# Patient Record
Sex: FEMALE | Race: WHITE | NOT HISPANIC OR LATINO | Employment: OTHER | URBAN - METROPOLITAN AREA
[De-identification: names, ages, dates, MRNs, and addresses within clinical notes are randomized per-mention and may not be internally consistent; named-entity substitution may affect disease eponyms.]

---

## 2020-06-16 ENCOUNTER — TELEPHONE (OUTPATIENT)
Dept: HEALTH INFORMATION MANAGEMENT | Facility: OTHER | Age: 55
End: 2020-06-16

## 2020-06-16 SDOH — ECONOMIC STABILITY: TRANSPORTATION INSECURITY
IN THE PAST 12 MONTHS, HAS LACK OF RELIABLE TRANSPORTATION KEPT YOU FROM MEDICAL APPOINTMENTS, MEETINGS, WORK OR FROM GETTING THINGS NEEDED FOR DAILY LIVING?: NO

## 2020-06-16 SDOH — ECONOMIC STABILITY: INCOME INSECURITY: IN THE LAST 12 MONTHS, WAS THERE A TIME WHEN YOU WERE NOT ABLE TO PAY THE MORTGAGE OR RENT ON TIME?: NO

## 2020-06-16 SDOH — HEALTH STABILITY: PHYSICAL HEALTH: ON AVERAGE, HOW MANY MINUTES DO YOU ENGAGE IN EXERCISE AT THIS LEVEL?: 10 MINUTES

## 2020-06-16 SDOH — ECONOMIC STABILITY: HOUSING INSECURITY
IN THE LAST 12 MONTHS, WAS THERE A TIME WHEN YOU DID NOT HAVE A STEADY PLACE TO SLEEP OR SLEPT IN A SHELTER (INCLUDING NOW)?: NO

## 2020-06-16 SDOH — ECONOMIC STABILITY: HOUSING INSECURITY: IN THE LAST 12 MONTHS, HOW MANY PLACES HAVE YOU LIVED?: 1

## 2020-06-16 SDOH — HEALTH STABILITY: PHYSICAL HEALTH: ON AVERAGE, HOW MANY DAYS PER WEEK DO YOU ENGAGE IN MODERATE TO STRENUOUS EXERCISE (LIKE A BRISK WALK)?: 2 DAYS

## 2020-06-16 SDOH — HEALTH STABILITY: MENTAL HEALTH
STRESS IS WHEN SOMEONE FEELS TENSE, NERVOUS, ANXIOUS, OR CAN'T SLEEP AT NIGHT BECAUSE THEIR MIND IS TROUBLED. HOW STRESSED ARE YOU?: RATHER MUCH

## 2020-06-16 SDOH — ECONOMIC STABILITY: TRANSPORTATION INSECURITY
IN THE PAST 12 MONTHS, HAS THE LACK OF TRANSPORTATION KEPT YOU FROM MEDICAL APPOINTMENTS OR FROM GETTING MEDICATIONS?: NO

## 2020-06-16 ASSESSMENT — SOCIAL DETERMINANTS OF HEALTH (SDOH)
IN A TYPICAL WEEK, HOW MANY TIMES DO YOU TALK ON THE PHONE WITH FAMILY, FRIENDS, OR NEIGHBORS?: THREE TIMES A WEEK
HOW OFTEN DO YOU ATTEND CHURCH OR RELIGIOUS SERVICES?: MORE THAN 4 TIMES PER YEAR
HOW MANY DRINKS CONTAINING ALCOHOL DO YOU HAVE ON A TYPICAL DAY WHEN YOU ARE DRINKING: 1 OR 2
WITHIN THE PAST 12 MONTHS, YOU WORRIED THAT YOUR FOOD WOULD RUN OUT BEFORE YOU GOT THE MONEY TO BUY MORE: NEVER TRUE
HOW OFTEN DO YOU HAVE SIX OR MORE DRINKS ON ONE OCCASION: NEVER
HOW OFTEN DO YOU ATTENT MEETINGS OF THE CLUB OR ORGANIZATION YOU BELONG TO?: NEVER
DO YOU BELONG TO ANY CLUBS OR ORGANIZATIONS SUCH AS CHURCH GROUPS UNIONS, FRATERNAL OR ATHLETIC GROUPS, OR SCHOOL GROUPS?: NO
WITHIN THE PAST 12 MONTHS, THE FOOD YOU BOUGHT JUST DIDN'T LAST AND YOU DIDN'T HAVE MONEY TO GET MORE: NEVER TRUE
HOW OFTEN DO YOU HAVE A DRINK CONTAINING ALCOHOL: 2-4 TIMES A MONTH
HOW OFTEN DO YOU GET TOGETHER WITH FRIENDS OR RELATIVES?: ONCE A WEEK
HOW HARD IS IT FOR YOU TO PAY FOR THE VERY BASICS LIKE FOOD, HOUSING, MEDICAL CARE, AND HEATING?: NOT VERY HARD

## 2020-06-16 NOTE — TELEPHONE ENCOUNTER
1. Caller Name: Rowena Tejeda                Call Back Number: [phone]  [facility] PCP or Specialty Provider: No          2.  In the last two weeks, has the patient had any new or worsening symptoms (not explained by alternative diagnosis)? No. Same body aches and pains mostly in legs for over a year r/t Arthritis;takes Ibuprofen.    3.  Does patient have any comoribidities? None     4.  Has the patient traveled in the last 14 days OR had any known contact with someone who is suspected or confirmed to have COVID-19?  No.    5. Disposition: Cleared by RN Triage as potential is low for COVID-19; OK to keep/schedule appointment    Note routed to [facility] Provider: FERNANDA only.

## 2020-06-19 ENCOUNTER — HOSPITAL ENCOUNTER (OUTPATIENT)
Dept: LAB | Facility: MEDICAL CENTER | Age: 55
End: 2020-06-19
Attending: FAMILY MEDICINE
Payer: COMMERCIAL

## 2020-06-19 ENCOUNTER — OFFICE VISIT (OUTPATIENT)
Dept: MEDICAL GROUP | Age: 55
End: 2020-06-19
Payer: COMMERCIAL

## 2020-06-19 VITALS
DIASTOLIC BLOOD PRESSURE: 62 MMHG | HEART RATE: 71 BPM | TEMPERATURE: 97.7 F | BODY MASS INDEX: 31.04 KG/M2 | SYSTOLIC BLOOD PRESSURE: 120 MMHG | OXYGEN SATURATION: 99 % | WEIGHT: 181.8 LBS | HEIGHT: 64 IN

## 2020-06-19 DIAGNOSIS — Z00.00 ANNUAL PHYSICAL EXAM: ICD-10-CM

## 2020-06-19 DIAGNOSIS — M79.10 MYALGIA, UNSPECIFIED SITE: ICD-10-CM

## 2020-06-19 DIAGNOSIS — Z12.12 SCREENING FOR COLORECTAL CANCER: Primary | ICD-10-CM

## 2020-06-19 DIAGNOSIS — Z00.00 ENCOUNTER FOR MEDICAL EXAMINATION TO ESTABLISH CARE: ICD-10-CM

## 2020-06-19 DIAGNOSIS — Z23 NEED FOR VACCINATION: ICD-10-CM

## 2020-06-19 DIAGNOSIS — E66.9 OBESITY (BMI 30-39.9): ICD-10-CM

## 2020-06-19 DIAGNOSIS — Z12.11 SCREENING FOR COLORECTAL CANCER: Primary | ICD-10-CM

## 2020-06-19 DIAGNOSIS — R10.11 RUQ PAIN: ICD-10-CM

## 2020-06-19 LAB
25(OH)D3 SERPL-MCNC: 30 NG/ML (ref 30–100)
ALBUMIN SERPL BCP-MCNC: 4.2 G/DL (ref 3.2–4.9)
ALBUMIN/GLOB SERPL: 1.4 G/DL
ALP SERPL-CCNC: 75 U/L (ref 30–99)
ALT SERPL-CCNC: 24 U/L (ref 2–50)
ANION GAP SERPL CALC-SCNC: 14 MMOL/L (ref 7–16)
AST SERPL-CCNC: 26 U/L (ref 12–45)
BILIRUB SERPL-MCNC: 0.6 MG/DL (ref 0.1–1.5)
BUN SERPL-MCNC: 13 MG/DL (ref 8–22)
CALCIUM SERPL-MCNC: 9.5 MG/DL (ref 8.5–10.5)
CHLORIDE SERPL-SCNC: 104 MMOL/L (ref 96–112)
CHOLEST SERPL-MCNC: 204 MG/DL (ref 100–199)
CO2 SERPL-SCNC: 25 MMOL/L (ref 20–33)
CREAT SERPL-MCNC: 0.51 MG/DL (ref 0.5–1.4)
ERYTHROCYTE [DISTWIDTH] IN BLOOD BY AUTOMATED COUNT: 39.6 FL (ref 35.9–50)
FASTING STATUS PATIENT QL REPORTED: NORMAL
GLOBULIN SER CALC-MCNC: 3 G/DL (ref 1.9–3.5)
GLUCOSE SERPL-MCNC: 76 MG/DL (ref 65–99)
HCT VFR BLD AUTO: 42.7 % (ref 37–47)
HDLC SERPL-MCNC: 57 MG/DL
HGB BLD-MCNC: 14.7 G/DL (ref 12–16)
LDLC SERPL CALC-MCNC: 125 MG/DL
MCH RBC QN AUTO: 30.1 PG (ref 27–33)
MCHC RBC AUTO-ENTMCNC: 34.4 G/DL (ref 33.6–35)
MCV RBC AUTO: 87.5 FL (ref 81.4–97.8)
PLATELET # BLD AUTO: 170 K/UL (ref 164–446)
PMV BLD AUTO: 8.8 FL (ref 9–12.9)
POTASSIUM SERPL-SCNC: 4.2 MMOL/L (ref 3.6–5.5)
PROT SERPL-MCNC: 7.2 G/DL (ref 6–8.2)
RBC # BLD AUTO: 4.88 M/UL (ref 4.2–5.4)
RHEUMATOID FACT SER IA-ACNC: 14 IU/ML (ref 0–14)
SODIUM SERPL-SCNC: 143 MMOL/L (ref 135–145)
T3FREE SERPL-MCNC: 2.55 PG/ML (ref 2–4.4)
T4 FREE SERPL-MCNC: 0.97 NG/DL (ref 0.93–1.7)
TRIGL SERPL-MCNC: 108 MG/DL (ref 0–149)
TSH SERPL DL<=0.005 MIU/L-ACNC: 2.29 UIU/ML (ref 0.38–5.33)
WBC # BLD AUTO: 4.2 K/UL (ref 4.8–10.8)

## 2020-06-19 PROCEDURE — 84443 ASSAY THYROID STIM HORMONE: CPT

## 2020-06-19 PROCEDURE — 90471 IMMUNIZATION ADMIN: CPT | Performed by: FAMILY MEDICINE

## 2020-06-19 PROCEDURE — 80061 LIPID PANEL: CPT

## 2020-06-19 PROCEDURE — 86038 ANTINUCLEAR ANTIBODIES: CPT

## 2020-06-19 PROCEDURE — 84481 FREE ASSAY (FT-3): CPT

## 2020-06-19 PROCEDURE — 86431 RHEUMATOID FACTOR QUANT: CPT

## 2020-06-19 PROCEDURE — 86200 CCP ANTIBODY: CPT

## 2020-06-19 PROCEDURE — 80053 COMPREHEN METABOLIC PANEL: CPT

## 2020-06-19 PROCEDURE — 36415 COLL VENOUS BLD VENIPUNCTURE: CPT

## 2020-06-19 PROCEDURE — 84439 ASSAY OF FREE THYROXINE: CPT

## 2020-06-19 PROCEDURE — 99204 OFFICE O/P NEW MOD 45 MIN: CPT | Mod: 25 | Performed by: FAMILY MEDICINE

## 2020-06-19 PROCEDURE — 85027 COMPLETE CBC AUTOMATED: CPT

## 2020-06-19 PROCEDURE — 90715 TDAP VACCINE 7 YRS/> IM: CPT | Performed by: FAMILY MEDICINE

## 2020-06-19 PROCEDURE — 82306 VITAMIN D 25 HYDROXY: CPT

## 2020-06-19 RX ORDER — TRAMADOL HYDROCHLORIDE 50 MG/1
50 TABLET ORAL EVERY 4 HOURS PRN
COMMUNITY
End: 2020-08-28 | Stop reason: SDUPTHER

## 2020-06-19 ASSESSMENT — PATIENT HEALTH QUESTIONNAIRE - PHQ9: CLINICAL INTERPRETATION OF PHQ2 SCORE: 0

## 2020-06-19 NOTE — PROGRESS NOTES
This medical record contains text that has been entered with the assistance of computer voice recognition and dictation software.  Therefore, it may contain unintended errors in text, spelling, punctuation, or grammar      Chief Complaint   Patient presents with   • Establish Care         Rowena Tejeda is a 54 y.o. female here evaluation and management of: Establish care      HPI:     1. Encounter for medical examination to establish care  The patient is a very pleasant 54-year-old female who presents to clinic to establish care.  She has significant past medical history of morbid obesity status post gastric sleeve currently at the obese level.  She is also complaining of myalgias throughout her body.    Today the patient denied any chest pain, shortness of breath no fevers, no chills no night sweats no unintentional weight loss.    Past medical history h/o abnormal paps    Family History of Cancer--- mother was adopted    Family History of CAD---mother with 60 CABG  She james smoker but still alive      Social History  Occupation----owns [employer]    Exercise---not consistent    Colonoscopy---overdue          2. Annual physical exam  She is due for annual labs.    3. Need for vaccination  Due for Shingrix vaccination    4. Screening for colorectal cancer  Patient has never had colonoscopy.      5. Obesity (BMI 30-39.9)  252 lbs before gastric sleeve  Today she is 181 pounds  He states she is not exercising she knows that is important.      6. Myalgia, unspecified site    The patient states she is worried that she might have some type of arthritis or something because she has diffuse muscle aches.  She states she has been using tramadol that she got from [place] every day for the past month or so.  She denies any new rashes no fevers chills no night sweats no generalized malaise.      7. RUQ pain  The patient states she is also experiencing some pain in the abdomen right upper which radiates to her back  "at times.  She states the pain is usually worse after she drinks a cocktail.  She is very concerned about having gallstones as well so would like to be evaluated for that as well.  She denies any fevers no chills no new rashes.    Current medicines (including changes today)  Current Outpatient Medications   Medication Sig Dispense Refill   • tramadol (ULTRAM) 50 MG Tab Take 50 mg by mouth every four hours as needed.       No current facility-administered medications for this visit.      She  has a past medical history of Fluid retention and Snoring.  She  has a past surgical history that includes pr  delivery only (); liposuction (2009); lip injection (2009); and gastric sleeve laparoscopy (2016).  Social History     Tobacco Use   • Smoking status: Never Smoker   • Smokeless tobacco: Never Used   Substance Use Topics   • Alcohol use: No     Alcohol/week: 0.0 oz     Frequency: 2-4 times a month     Drinks per session: 1 or 2     Binge frequency: Never   • Drug use: No     Social History     Social History Narrative   • Not on file     Family History   Problem Relation Age of Onset   • Non-contributory Neg Hx      No family status information on file.         ROS    The pertinent  ROS findings can be seen in the HPI above.     All other systems reviewed and are negative     Objective:     /62 (BP Location: Left arm, Patient Position: Sitting, BP Cuff Size: Adult)   Pulse 71   Temp 36.5 °C (97.7 °F) (Temporal)   Ht 1.626 m (5' 4\")   Wt 82.5 kg (181 lb 12.8 oz)   SpO2 99%  Body mass index is 31.21 kg/m².      Physical Exam:    Constitutional: Alert, no distress.  Skin: no rashes  Eye: Equal, round and reactive, conjunctiva clear, lids normal.  ENMT: Lips without lesions, good dentition, oropharynx clear.  Neck: Trachea midline, no masses, no thyromegaly. No cervical or supraclavicular lymphadenopathy.  Respiratory: Unlabored respiratory effort, lungs clear to auscultation, no " wheezes, no ronchi.  Cardiovascular: Normal S1, S2, no murmur, no edema  Abdomen: Soft, non-tender, no masses, no hepatosplenomegaly.        Assessment and Plan:   The following treatment plan was discussed    1. Encounter for medical examination to establish care  Care has been established  We need  baseline labs to establish a clinical profile      Age-appropriate preventive services recommended by USPTF guidelines and ACIP were discussed today.    The USPSTF recommends initiating low-dose aspirin use for the primary prevention of cardiovascular disease (CVD) and colorectal cancer (CRC) in adults aged 50 to 59 years who have a 10% or greater 10-year CVD risk, are not at increased risk for bleeding, have a life expectancy of at least 10 years, and are willing to take low-dose aspirin daily for at least 10 years.      Requested any outside Medical records to be sent to us  Denies intimate partner viloence  Discussed seat belt safety        2. Annual physical exam    Due for annual labs    - Comp Metabolic Panel; Future  - CBC WITHOUT DIFFERENTIAL; Future  - Lipid Profile; Future  - TSH+FREE T4  - T3 FREE; Future  - VITAMIN D,25 HYDROXY; Future    3. Need for vaccination  Vaccine was administered today without adverse event.    - Tdap Vaccine =>8YO IM    4. Screening for colorectal cancer  Due for colon cancer screening  No personal or familial history of colon cancer.   No acute gastrointestinal complaints including abdominal pain, bowel changes, hematochezia or melena.     - REFERRAL TO GI FOR COLONOSCOPY    5. Obesity (BMI 30-39.9)  We specifically discussed a comprehensive lifestyle intervention which includes combination of diet exercise and behavioral modification.  We discussed self-monitoring of weight, food intake and exercise logging.  We discussed that people with obesity have learned maladaptive patterns of eating and exercise that are contributing to their weight gain or maintenance of their overweight  state.  The second is that behaviors can be modified and that weight loss will result if the patient is persisting.      6. Myalgia, unspecified site  We discussed the importance of having a regular exercise regimen to include stretching.  We will obtain an autoimmune panel as well  I suggested that she wean off the tramadol  Use Tylenol for pain as well as exercise and stretching      - ELISE REFLEXIVE PROFILE; Future  - CCP-CYCLIC CITRULLINATED PEPTID; Future  - RHEUMATOID ARTHRITIS FACTOR; Future    7. RUQ pain  Proceed with RUQ US    - US-RUQ; Future        Instructed to Follow up in clinic or ER for worsening symptoms, difficulty breathing, lack of expected recovery, or should new symptoms or problems arise.    Followup: Return in about 6 months (around 12/19/2020) for labs.       Once again this medical record contains text that has been entered with the assistance of computer voice recognition and dictation software.  Therefore, it may contain unintended errors in text, spelling, punctuation, or grammar

## 2020-06-22 LAB
CCP IGG SERPL-ACNC: 118 UNITS (ref 0–19)
NUCLEAR IGG SER QL IA: NORMAL

## 2020-06-23 DIAGNOSIS — R76.8 POSITIVE ANTI-CCP TEST: ICD-10-CM

## 2020-06-23 DIAGNOSIS — M79.10 MYALGIA, UNSPECIFIED SITE: ICD-10-CM

## 2020-06-25 RX ORDER — PREDNISONE 20 MG/1
TABLET ORAL
Qty: 12 TAB | Refills: 0 | Status: SHIPPED | OUTPATIENT
Start: 2020-06-25 | End: 2020-06-25 | Stop reason: SDUPTHER

## 2020-06-25 RX ORDER — PREDNISONE 20 MG/1
TABLET ORAL
Qty: 12 TAB | Refills: 0 | Status: SHIPPED | OUTPATIENT
Start: 2020-06-25 | End: 2020-12-04

## 2020-06-26 ENCOUNTER — HOSPITAL ENCOUNTER (OUTPATIENT)
Dept: LAB | Facility: MEDICAL CENTER | Age: 55
End: 2020-06-26
Attending: FAMILY MEDICINE
Payer: COMMERCIAL

## 2020-06-26 ENCOUNTER — OFFICE VISIT (OUTPATIENT)
Dept: MEDICAL GROUP | Age: 55
End: 2020-06-26
Payer: COMMERCIAL

## 2020-06-26 VITALS
HEART RATE: 80 BPM | HEIGHT: 64 IN | OXYGEN SATURATION: 96 % | DIASTOLIC BLOOD PRESSURE: 82 MMHG | TEMPERATURE: 97.2 F | BODY MASS INDEX: 30.22 KG/M2 | SYSTOLIC BLOOD PRESSURE: 140 MMHG | WEIGHT: 177 LBS

## 2020-06-26 DIAGNOSIS — D48.9 NEOPLASM OF UNCERTAIN BEHAVIOR: ICD-10-CM

## 2020-06-26 LAB — PATHOLOGY CONSULT NOTE: NORMAL

## 2020-06-26 PROCEDURE — 99000 SPECIMEN HANDLING OFFICE-LAB: CPT | Performed by: FAMILY MEDICINE

## 2020-06-26 PROCEDURE — 88304 TISSUE EXAM BY PATHOLOGIST: CPT | Mod: 59

## 2020-06-26 PROCEDURE — 11401 EXC TR-EXT B9+MARG 0.6-1 CM: CPT | Mod: XS | Performed by: FAMILY MEDICINE

## 2020-06-26 ASSESSMENT — FIBROSIS 4 INDEX: FIB4 SCORE: 1.62

## 2020-06-26 NOTE — PROGRESS NOTES
This medical record contains text that has been entered with the assistance of computer voice recognition and dictation software.  Therefore, it may contain unintended errors in text, spelling, punctuation, or grammar      Chief Complaint   Patient presents with   • Biopsy         Rowena Tejeda is a 54 y.o. female here evaluation and management of: Cyst on back      HPI:     1. Neoplasm of uncertain behavior  NEW PROBLEM    Patient is a 54-year-old female who presents to clinic with a chief complaint of having painful growing cyst on her back.  She states her previous primary care provider would remove them.  They are now becoming bigger more painful and very uncomfortable.    Current medicines (including changes today)  Current Outpatient Medications   Medication Sig Dispense Refill   • predniSONE (DELTASONE) 20 MG Tab Take 3 tabs po for 2 days then 2 tabs for 2 days then 1 for 2 days 12 Tab 0   • tramadol (ULTRAM) 50 MG Tab Take 1 Tab by mouth 2 times a day as needed for up to 30 days. 60 Tab 0   • predniSONE (DELTASONE) 5 MG Tab Take 1 Tab by mouth every day. 30 Tab 0   • methotrexate 2.5 MG Tab Take 6 tabs po with dinner q 7days 48 Tab 0   • methotrexate 2.5 MG Tab Take 6 tabs po with dinner q 7 days 42 Tab 0     No current facility-administered medications for this visit.      She  has a past medical history of Fluid retention and Snoring.  She  has a past surgical history that includes pr  delivery only (); liposuction (2009); lip injection (2009); and gastric sleeve laparoscopy (2016).  Social History     Tobacco Use   • Smoking status: Never Smoker   • Smokeless tobacco: Never Used   Substance Use Topics   • Alcohol use: Not Currently     Alcohol/week: 0.0 oz     Frequency: 2-4 times a month     Drinks per session: 1 or 2     Binge frequency: Never   • Drug use: No     Social History     Social History Narrative   • Not on file     Family History   Problem Relation Age of  "Onset   • Non-contributory Neg Hx      No family status information on file.         ROS    The pertinent  ROS findings can be seen in the HPI above.     All other systems reviewed and are negative     Objective:     /82 (BP Location: Right arm, Patient Position: Sitting, BP Cuff Size: Adult)   Pulse 80   Temp 36.2 °C (97.2 °F) (Temporal)   Ht 1.626 m (5' 4\")   Wt 80.3 kg (177 lb)   SpO2 96%  Body mass index is 30.38 kg/m².      Physical Exam:    Constitutional: Alert, no distress.      Excision--- 7 mm, symmetric, firm raised mass on mid  to touch      Similar lesion on upper back    After informed written consent was obtained, using Betadine for cleansing and 1% Lidocaine w- epinephrine for anesthetic, with sterile technique a 10 blade  was used to obtain and excise  the lesion through dermis (full thickness) . Intent was to remove entire lesion with margins . Hemostasis was obtained by pressure and wound was  Sutured  With 3.0 Ethilon x2  Antibiotic dressing is applied, and wound care instructions provided. Be alert for any signs of cutaneous infection. The specimen is labeled and sent to pathology for evaluation. The procedure was well tolerated without complications.      Final repair length (measurement of repaired defect): 2cm      Same procedure repeated on 2nd lesion (also 7mm) on upper back    Eye: Equal, round and reactive, conjunctiva clear, lids normal.  ENMT: Lips without lesions, good dentition, oropharynx clear.  Neck: Trachea midline, no masses, no thyromegaly. No cervical or supraclavicular lymphadenopathy.  Respiratory: Unlabored respiratory effort, lungs clear to auscultation, no wheezes, no ronchi.  Cardiovascular: Normal S1, S2, no murmur, no edema  Abdomen: Soft, non-tender, no masses, no hepatosplenomegaly.        Assessment and Plan:   The following treatment plan was discussed      1. Neoplasm of uncertain behavior  Patient tolerated procedure well  There were no " adverse events  Patient was given post procedure precautions       - Pathology Specimen; Future  - Pathology Specimen; Future      Instructed to Follow up in clinic or ER for worsening symptoms, difficulty breathing, lack of expected recovery, or should new symptoms or problems arise.    Followup: Return in about 10 days (around 7/6/2020) for Suture Removal with MA.       Once again this medical record contains text that has been entered with the assistance of computer voice recognition and dictation software.  Therefore, it may contain unintended errors in text, spelling, punctuation, or grammar

## 2020-07-05 ENCOUNTER — HOSPITAL ENCOUNTER (OUTPATIENT)
Dept: RADIOLOGY | Facility: MEDICAL CENTER | Age: 55
End: 2020-07-05
Attending: FAMILY MEDICINE
Payer: COMMERCIAL

## 2020-07-05 DIAGNOSIS — R10.11 RUQ PAIN: ICD-10-CM

## 2020-07-05 PROCEDURE — 76705 ECHO EXAM OF ABDOMEN: CPT

## 2020-07-07 ENCOUNTER — NON-PROVIDER VISIT (OUTPATIENT)
Dept: MEDICAL GROUP | Age: 55
End: 2020-07-07
Payer: COMMERCIAL

## 2020-07-07 DIAGNOSIS — K80.20 GALL STONES: ICD-10-CM

## 2020-07-07 NOTE — PROGRESS NOTES
KAIA Tejeda is a 54 y.o. female here for a Non-Provider Visit for Suture Removal.    Sutures were placed by Dr. Mccormick on date: 06/26/2020  Skin is healed: Yes  Provider notified if skin is not healed, or if there is redness, heat, pain, or drainage from incision: yes  Pt states no heat, pain or drainage at both incision sites.   Patients back had two incision sites. Each site had two sutures. A total of four sutures was removed. Dr. Mccormick looked at both sites, and per Dr. Mccormick both sites appeared healed with no redness and no drainage from incision.  Sutures removed.   Mastisol and steristips are placed: No    Advised to use emollient (vaseline, aquaphor, etc.) as needed, avoid peroxide and antibiotic ointment to reduce irritation.     Path report has been reviewed by provider.  Path report has been reviewed with patient.

## 2020-07-10 DIAGNOSIS — M06.9 RHEUMATOID ARTHRITIS INVOLVING MULTIPLE SITES, UNSPECIFIED RHEUMATOID FACTOR PRESENCE: ICD-10-CM

## 2020-07-17 DIAGNOSIS — M06.9 RHEUMATOID ARTHRITIS INVOLVING MULTIPLE SITES, UNSPECIFIED RHEUMATOID FACTOR PRESENCE: ICD-10-CM

## 2020-07-17 RX ORDER — TRAMADOL HYDROCHLORIDE 50 MG/1
50 TABLET ORAL 2 TIMES DAILY PRN
Qty: 60 TAB | Refills: 0 | Status: SHIPPED | OUTPATIENT
Start: 2020-07-17 | End: 2020-07-17 | Stop reason: SDUPTHER

## 2020-07-17 RX ORDER — TRAMADOL HYDROCHLORIDE 50 MG/1
50 TABLET ORAL 2 TIMES DAILY PRN
Qty: 60 TAB | Refills: 0 | Status: SHIPPED | OUTPATIENT
Start: 2020-07-17 | End: 2020-08-28 | Stop reason: SDUPTHER

## 2020-08-28 ENCOUNTER — HOSPITAL ENCOUNTER (OUTPATIENT)
Dept: RADIOLOGY | Facility: MEDICAL CENTER | Age: 55
End: 2020-08-28
Attending: INTERNAL MEDICINE
Payer: COMMERCIAL

## 2020-08-28 ENCOUNTER — HOSPITAL ENCOUNTER (OUTPATIENT)
Dept: LAB | Facility: MEDICAL CENTER | Age: 55
End: 2020-08-28
Attending: INTERNAL MEDICINE
Payer: COMMERCIAL

## 2020-08-28 ENCOUNTER — OFFICE VISIT (OUTPATIENT)
Dept: MEDICAL GROUP | Age: 55
End: 2020-08-28
Payer: COMMERCIAL

## 2020-08-28 VITALS
BODY MASS INDEX: 31.24 KG/M2 | DIASTOLIC BLOOD PRESSURE: 80 MMHG | WEIGHT: 183 LBS | HEART RATE: 78 BPM | OXYGEN SATURATION: 96 % | SYSTOLIC BLOOD PRESSURE: 130 MMHG | HEIGHT: 64 IN | TEMPERATURE: 97.5 F

## 2020-08-28 DIAGNOSIS — M53.3 DISORDER OF SACRUM: ICD-10-CM

## 2020-08-28 DIAGNOSIS — M54.50 BILATERAL LOW BACK PAIN, UNSPECIFIED CHRONICITY, UNSPECIFIED WHETHER SCIATICA PRESENT: ICD-10-CM

## 2020-08-28 DIAGNOSIS — M79.641 RIGHT HAND PAIN: ICD-10-CM

## 2020-08-28 DIAGNOSIS — M54.50 LOW BACK PAIN, UNSPECIFIED BACK PAIN LATERALITY, UNSPECIFIED CHRONICITY, UNSPECIFIED WHETHER SCIATICA PRESENT: ICD-10-CM

## 2020-08-28 DIAGNOSIS — M79.672 LEFT FOOT PAIN: ICD-10-CM

## 2020-08-28 DIAGNOSIS — M54.2 CERVICALGIA: ICD-10-CM

## 2020-08-28 DIAGNOSIS — M06.9 RHEUMATOID ARTHRITIS INVOLVING MULTIPLE SITES, UNSPECIFIED RHEUMATOID FACTOR PRESENCE: ICD-10-CM

## 2020-08-28 DIAGNOSIS — M79.642 LEFT HAND PAIN: ICD-10-CM

## 2020-08-28 DIAGNOSIS — E66.9 OBESITY (BMI 30-39.9): ICD-10-CM

## 2020-08-28 DIAGNOSIS — E78.00 PURE HYPERCHOLESTEROLEMIA: ICD-10-CM

## 2020-08-28 DIAGNOSIS — M79.671 RIGHT FOOT PAIN: ICD-10-CM

## 2020-08-28 DIAGNOSIS — Z12.31 ENCOUNTER FOR SCREENING MAMMOGRAM FOR BREAST CANCER: ICD-10-CM

## 2020-08-28 LAB
ALBUMIN SERPL BCP-MCNC: 4 G/DL (ref 3.2–4.9)
ALP SERPL-CCNC: 126 U/L (ref 30–99)
ALT SERPL-CCNC: 53 U/L (ref 2–50)
AST SERPL-CCNC: 40 U/L (ref 12–45)
BASOPHILS # BLD AUTO: 0.4 % (ref 0–1.8)
BASOPHILS # BLD: 0.02 K/UL (ref 0–0.12)
BILIRUB CONJ SERPL-MCNC: <0.2 MG/DL (ref 0.1–0.5)
BILIRUB INDIRECT SERPL-MCNC: ABNORMAL MG/DL (ref 0–1)
BILIRUB SERPL-MCNC: 0.4 MG/DL (ref 0.1–1.5)
CK SERPL-CCNC: 34 U/L (ref 0–154)
CREAT SERPL-MCNC: 0.67 MG/DL (ref 0.5–1.4)
CRP SERPL HS-MCNC: 0.55 MG/DL (ref 0–0.75)
EOSINOPHIL # BLD AUTO: 0.08 K/UL (ref 0–0.51)
EOSINOPHIL NFR BLD: 1.7 % (ref 0–6.9)
ERYTHROCYTE [DISTWIDTH] IN BLOOD BY AUTOMATED COUNT: 46.1 FL (ref 35.9–50)
ERYTHROCYTE [SEDIMENTATION RATE] IN BLOOD BY WESTERGREN METHOD: 20 MM/HOUR (ref 0–30)
HCT VFR BLD AUTO: 37.7 % (ref 37–47)
HGB BLD-MCNC: 13.3 G/DL (ref 12–16)
IMM GRANULOCYTES # BLD AUTO: 0.01 K/UL (ref 0–0.11)
IMM GRANULOCYTES NFR BLD AUTO: 0.2 % (ref 0–0.9)
LYMPHOCYTES # BLD AUTO: 1.84 K/UL (ref 1–4.8)
LYMPHOCYTES NFR BLD: 40.2 % (ref 22–41)
MCH RBC QN AUTO: 30.6 PG (ref 27–33)
MCHC RBC AUTO-ENTMCNC: 35.3 G/DL (ref 33.6–35)
MCV RBC AUTO: 86.7 FL (ref 81.4–97.8)
MONOCYTES # BLD AUTO: 0.35 K/UL (ref 0–0.85)
MONOCYTES NFR BLD AUTO: 7.6 % (ref 0–13.4)
NEUTROPHILS # BLD AUTO: 2.28 K/UL (ref 2–7.15)
NEUTROPHILS NFR BLD: 49.9 % (ref 44–72)
NRBC # BLD AUTO: 0 K/UL
NRBC BLD-RTO: 0 /100 WBC
PLATELET # BLD AUTO: 188 K/UL (ref 164–446)
PMV BLD AUTO: 8 FL (ref 9–12.9)
PROT SERPL-MCNC: 6.9 G/DL (ref 6–8.2)
RBC # BLD AUTO: 4.35 M/UL (ref 4.2–5.4)
WBC # BLD AUTO: 4.6 K/UL (ref 4.8–10.8)

## 2020-08-28 PROCEDURE — 82550 ASSAY OF CK (CPK): CPT

## 2020-08-28 PROCEDURE — 86140 C-REACTIVE PROTEIN: CPT

## 2020-08-28 PROCEDURE — 72040 X-RAY EXAM NECK SPINE 2-3 VW: CPT

## 2020-08-28 PROCEDURE — 99214 OFFICE O/P EST MOD 30 MIN: CPT | Performed by: FAMILY MEDICINE

## 2020-08-28 PROCEDURE — 85652 RBC SED RATE AUTOMATED: CPT

## 2020-08-28 PROCEDURE — 82565 ASSAY OF CREATININE: CPT

## 2020-08-28 PROCEDURE — 73130 X-RAY EXAM OF HAND: CPT | Mod: RT

## 2020-08-28 PROCEDURE — 36415 COLL VENOUS BLD VENIPUNCTURE: CPT

## 2020-08-28 PROCEDURE — 72100 X-RAY EXAM L-S SPINE 2/3 VWS: CPT

## 2020-08-28 PROCEDURE — 72202 X-RAY EXAM SI JOINTS 3/> VWS: CPT

## 2020-08-28 PROCEDURE — 77077 JOINT SURVEY SINGLE VIEW: CPT

## 2020-08-28 PROCEDURE — 85025 COMPLETE CBC W/AUTO DIFF WBC: CPT

## 2020-08-28 PROCEDURE — 80076 HEPATIC FUNCTION PANEL: CPT

## 2020-08-28 PROCEDURE — 73130 X-RAY EXAM OF HAND: CPT | Mod: LT

## 2020-08-28 RX ORDER — TRAMADOL HYDROCHLORIDE 50 MG/1
50 TABLET ORAL 2 TIMES DAILY PRN
Qty: 60 TAB | Refills: 2 | Status: SHIPPED | OUTPATIENT
Start: 2020-08-28 | End: 2020-12-04 | Stop reason: SDUPTHER

## 2020-08-28 ASSESSMENT — FIBROSIS 4 INDEX: FIB4 SCORE: 1.62

## 2020-09-01 NOTE — PROGRESS NOTES
This medical record contains text that has been entered with the assistance of computer voice recognition and dictation software.  Therefore, it may contain unintended errors in text, spelling, punctuation, or grammar      Chief Complaint   Patient presents with   • Medication Refill     Tramadol refilll   • Cyst     upper back still has a lump         Rowena Tejeda is a 54 y.o. female here evaluation and management of: routine follow up      HPI:     1. Rheumatoid arthritis involving multiple sites, unspecified rheumatoid factor presence (HCC)  The patient has lab proven rheumatoid arthritis continues to have pain despite being on methotrexate.  We did refer her to rheumatology she has established care there.  She states that the tramadol is what really helps and she would like some for the hard days.  She denies any fevers chills night sweats.    2. Encounter for screening mammogram for breast cancer  Patient is due for a mammogram.    3. Pure hypercholesterolemia  NEW PROBLEM    Results for ROWENA TEJEDA (MRN [id number]) as of 9/1/2020 09:55   Ref. Range 6/19/2020 09:30   Cholesterol,Tot Latest Ref Range: 100 - 199 mg/dL 204 (H)   Triglycerides Latest Ref Range: 0 - 149 mg/dL 108   HDL Latest Ref Range: >=40 mg/dL 57   LDL Latest Ref Range: <100 mg/dL 125 (H)       4. Obesity (BMI 30-39.9)  Patient states that she has been working hard to lose weight through diet and exercise.  She is very motivated and plans to continue.  Current medicines (including changes today)  Current Outpatient Medications   Medication Sig Dispense Refill   • tramadol (ULTRAM) 50 MG Tab Take 1 Tab by mouth 2 times a day as needed for up to 30 days. 60 Tab 2   • methotrexate 2.5 MG Tab Take 6 tabs po with dinner q 7days 48 Tab 0   • methotrexate 2.5 MG Tab Take 6 tabs po with dinner q 7 days 42 Tab 0   • predniSONE (DELTASONE) 5 MG Tab Take 1 Tab by mouth every day. (Patient not taking: Reported on 8/28/2020) 30 Tab 0   •  "predniSONE (DELTASONE) 20 MG Tab Take 3 tabs po for 2 days then 2 tabs for 2 days then 1 for 2 days (Patient not taking: Reported on 2020) 12 Tab 0     No current facility-administered medications for this visit.      She  has a past medical history of Fluid retention and Snoring.  She  has a past surgical history that includes pr  delivery only (); liposuction (2009); lip injection (2009); and gastric sleeve laparoscopy (2016).  Social History     Tobacco Use   • Smoking status: Never Smoker   • Smokeless tobacco: Never Used   Substance Use Topics   • Alcohol use: Not Currently     Alcohol/week: 0.0 oz     Frequency: 2-4 times a month     Drinks per session: 1 or 2     Binge frequency: Never   • Drug use: No     Social History     Social History Narrative   • Not on file     Family History   Problem Relation Age of Onset   • Non-contributory Neg Hx      No family status information on file.         ROS    The pertinent  ROS findings can be seen in the HPI above.     All other systems reviewed and are negative     Objective:     /80 (BP Location: Left arm, Patient Position: Sitting, BP Cuff Size: Adult)   Pulse 78   Temp 36.4 °C (97.5 °F) (Temporal)   Ht 1.626 m (5' 4\")   Wt 83 kg (183 lb)   SpO2 96%  Body mass index is 31.41 kg/m².      Physical Exam:    Constitutional: Alert, no distress.  Skin: no rashes  Eye: Equal, round and reactive, conjunctiva clear, lids normal.  ENMT: Lips without lesions, good dentition, oropharynx clear.  Neck: Trachea midline, no masses, no thyromegaly. No cervical or supraclavicular lymphadenopathy.  Respiratory: Unlabored respiratory effort, lungs clear to auscultation, no wheezes, no ronchi.  Cardiovascular: Normal S1, S2, no murmur, no edema  Abdomen: Soft, non-tender, no masses, no hepatosplenomegaly.        Assessment and Plan:   The following treatment plan was discussed      1. Rheumatoid arthritis involving multiple sites, " unspecified rheumatoid factor presence (HCC)    Since this does give her some relief I think it is clear to give her Ultram for severe pain.  She is to continue to follow-up with rheumatology.    - tramadol (ULTRAM) 50 MG Tab; Take 1 Tab by mouth 2 times a day as needed for up to 30 days.  Dispense: 60 Tab; Refill: 2    2. Encounter for screening mammogram for breast cancer  Due for breast cancer screening      - MA-SCREENING MAMMO BILAT W/CAD; Future    3. Pure hypercholesterolemia  We will obtain new labs to update clinical profile.  Then we will adjust therapy as needed.      4. Obesity (BMI 30-39.9)  We specifically discussed a comprehensive lifestyle intervention which includes combination of diet exercise and behavioral modification.  We discussed self-monitoring of weight, food intake and exercise logging.  We discussed that people with obesity have learned maladaptive patterns of eating and exercise that are contributing to their weight gain or maintenance of their overweight state.  The second is that behaviors can be modified and that weight loss will result if the patient is persisting.          Instructed to Follow up in clinic or ER for worsening symptoms, difficulty breathing, lack of expected recovery, or should new symptoms or problems arise.    Followup: Return in about 3 months (around 11/28/2020) for Reevaluation, labs.       Once again this medical record contains text that has been entered with the assistance of computer voice recognition and dictation software.  Therefore, it may contain unintended errors in text, spelling, punctuation, or grammar

## 2020-11-03 ENCOUNTER — HOSPITAL ENCOUNTER (OUTPATIENT)
Dept: LAB | Facility: MEDICAL CENTER | Age: 55
End: 2020-11-03
Attending: INTERNAL MEDICINE
Payer: COMMERCIAL

## 2020-11-03 LAB
25(OH)D3 SERPL-MCNC: 30 NG/ML (ref 30–100)
ALBUMIN SERPL BCP-MCNC: 3.4 G/DL (ref 3.2–4.9)
ALP SERPL-CCNC: 109 U/L (ref 30–99)
ALT SERPL-CCNC: 16 U/L (ref 2–50)
AST SERPL-CCNC: 17 U/L (ref 12–45)
BILIRUB CONJ SERPL-MCNC: <0.2 MG/DL (ref 0.1–0.5)
BILIRUB INDIRECT SERPL-MCNC: ABNORMAL MG/DL (ref 0–1)
BILIRUB SERPL-MCNC: 0.4 MG/DL (ref 0.1–1.5)
PROT SERPL-MCNC: 6.5 G/DL (ref 6–8.2)

## 2020-11-03 PROCEDURE — 82306 VITAMIN D 25 HYDROXY: CPT

## 2020-11-03 PROCEDURE — 36415 COLL VENOUS BLD VENIPUNCTURE: CPT

## 2020-11-03 PROCEDURE — 80076 HEPATIC FUNCTION PANEL: CPT

## 2020-12-04 ENCOUNTER — TELEMEDICINE (OUTPATIENT)
Dept: MEDICAL GROUP | Age: 55
End: 2020-12-04
Payer: COMMERCIAL

## 2020-12-04 VITALS — BODY MASS INDEX: 25.61 KG/M2 | HEIGHT: 64 IN | WEIGHT: 150 LBS

## 2020-12-04 DIAGNOSIS — M05.7A RHEUMATOID ARTHRITIS OF OTHER SITE WITH POSITIVE RHEUMATOID FACTOR (HCC): ICD-10-CM

## 2020-12-04 PROCEDURE — 99213 OFFICE O/P EST LOW 20 MIN: CPT | Mod: 95,CR | Performed by: FAMILY MEDICINE

## 2020-12-04 RX ORDER — TRAMADOL HYDROCHLORIDE 50 MG/1
TABLET ORAL 2 TIMES DAILY PRN
COMMUNITY
Start: 2020-11-08 | End: 2022-02-23 | Stop reason: SDUPTHER

## 2020-12-04 RX ORDER — LEFLUNOMIDE 10 MG/1
TABLET ORAL DAILY
COMMUNITY
Start: 2020-11-04 | End: 2022-03-04

## 2020-12-04 RX ORDER — GABAPENTIN 300 MG/1
300 CAPSULE ORAL 3 TIMES DAILY
Qty: 90 CAP | Refills: 3 | Status: SHIPPED | OUTPATIENT
Start: 2020-12-04 | End: 2021-04-19

## 2020-12-04 RX ORDER — TRAMADOL HYDROCHLORIDE 50 MG/1
50 TABLET ORAL 2 TIMES DAILY PRN
Qty: 60 TAB | Refills: 2 | Status: SHIPPED | OUTPATIENT
Start: 2020-12-04 | End: 2022-03-04 | Stop reason: SDUPTHER

## 2020-12-04 ASSESSMENT — FIBROSIS 4 INDEX: FIB4 SCORE: 1.18

## 2020-12-04 NOTE — PROGRESS NOTES
Virtual Visit: Established Patient   This visit was conducted via Zoom using secure and encrypted videoconferencing technology. The patient was in a private location in the state of [place].    The patient's identity was confirmed and verbal consent was obtained for this virtual visit.    Subjective:   CC:   Chief Complaint   Patient presents with   • Follow-Up     RA       Rowena Tejeda is a 54 y.o. female presenting for evaluation and management of:    1. Rheumatoid arthritis of other site with positive rheumatoid factor  The patient has been diagnosed with rheumatoid arthritis she has tried methotrexate with no relief.  She is also currently trying Arava with no improvement.  She states she gets mild improvement with tramadol and Advil.  However she knows that she is to avoid NSAIDs because of her bariatric surgery.  She would like to add another medication to tramadol to get some relief.  She continues to follow-up with rheumatologist.  She denies any fevers chills night sweats no unintentional weight loss no new rashes, no fevers no chills, no headaches, no change in vision no nausea or vomiting.    ROS   Denies any recent fevers or chills. No nausea or vomiting. No chest pains or shortness of breath.     No Known Allergies    Current medicines (including changes today)  Current Outpatient Medications   Medication Sig Dispense Refill   • leflunomide (ARAVA) 10 MG Tab Take  by mouth every day. Take 1 tablet by mouth every day     • traMADol (ULTRAM) 50 MG Tab Take  by mouth 2 times a day as needed. Take 1 tablet by mouth twice a day as needed     • tramadol (ULTRAM) 50 MG Tab Take 1 Tab by mouth 2 times a day as needed for up to 30 days. 60 Tab 2     No current facility-administered medications for this visit.        Patient Active Problem List    Diagnosis Date Noted   • Pure hypercholesterolemia 08/28/2020   • Rheumatoid arthritis involving multiple sites (HCC) 07/10/2020   • Neoplasm of uncertain behavior  "2020   • Myalgia, unspecified site 2020   • Obesity (BMI 30-39.9) 2020   • RUQ pain 2020       Family History   Problem Relation Age of Onset   • Non-contributory Neg Hx        She  has a past medical history of Fluid retention and Snoring.  She  has a past surgical history that includes pr  delivery only (); liposuction (2009); lip injection (2009); and gastric sleeve laparoscopy (2016).       Objective:   Ht 1.626 m (5' 4\") Comment: pt states  Wt 68 kg (150 lb) Comment: pt states  BMI 25.75 kg/m²     Physical Exam:  Constitutional: Alert, no distress, well-groomed.  Skin: No rashes in visible areas.  Eye: Round. Conjunctiva clear, lids normal. No icterus.   ENMT: Lips pink without lesions, good dentition, moist mucous membranes. Phonation normal.  Neck: No masses, no thyromegaly. Moves freely without pain.  Respiratory: Unlabored respiratory effort, no cough or audible wheeze  Psych: Alert and oriented x3, normal affect and mood.       Assessment and Plan:   The following treatment plan was discussed:     1. Rheumatoid arthritis of other site with positive rheumatoid factor    We can add gabapentin to her tramadol.  Patient was instructed to also follow-up with her rheumatologist    - traMADol (ULTRAM) 50 MG Tab; Take 1 Tab by mouth 2 times a day as needed for up to 30 days.  Dispense: 60 Tab; Refill: 2    Other orders  - leflunomide (ARAVA) 10 MG Tab; Take  by mouth every day. Take 1 tablet by mouth every day  - traMADol (ULTRAM) 50 MG Tab; Take  by mouth 2 times a day as needed. Take 1 tablet by mouth twice a day as needed  - gabapentin (NEURONTIN) 300 MG Cap; Take 1 Cap by mouth 3 times a day.  Dispense: 90 Cap; Refill: 3        Follow-up: Return in about 3 months (around 3/4/2021) for Reevaluation.         "

## 2021-04-14 ENCOUNTER — IMMUNIZATION (OUTPATIENT)
Dept: FAMILY PLANNING/WOMEN'S HEALTH CLINIC | Facility: IMMUNIZATION CENTER | Age: 56
End: 2021-04-14

## 2021-04-14 DIAGNOSIS — Z23 ENCOUNTER FOR VACCINATION: Primary | ICD-10-CM

## 2021-04-14 PROCEDURE — 0001A PFIZER SARS-COV-2 VACCINE: CPT

## 2021-04-14 PROCEDURE — 91300 PFIZER SARS-COV-2 VACCINE: CPT

## 2021-04-20 RX ORDER — GABAPENTIN 300 MG/1
CAPSULE ORAL
Qty: 90 CAPSULE | Refills: 3 | Status: SHIPPED | OUTPATIENT
Start: 2021-04-20 | End: 2022-03-04

## 2021-05-06 ENCOUNTER — IMMUNIZATION (OUTPATIENT)
Dept: FAMILY PLANNING/WOMEN'S HEALTH CLINIC | Facility: IMMUNIZATION CENTER | Age: 56
End: 2021-05-06

## 2021-05-06 DIAGNOSIS — Z23 ENCOUNTER FOR VACCINATION: Primary | ICD-10-CM

## 2021-05-06 PROCEDURE — 91300 PFIZER SARS-COV-2 VACCINE: CPT | Performed by: INTERNAL MEDICINE

## 2021-05-06 PROCEDURE — 0002A PFIZER SARS-COV-2 VACCINE: CPT | Performed by: INTERNAL MEDICINE

## 2022-03-04 ENCOUNTER — OFFICE VISIT (OUTPATIENT)
Dept: MEDICAL GROUP | Age: 57
End: 2022-03-04
Payer: COMMERCIAL

## 2022-03-04 VITALS
SYSTOLIC BLOOD PRESSURE: 138 MMHG | WEIGHT: 172 LBS | OXYGEN SATURATION: 97 % | DIASTOLIC BLOOD PRESSURE: 90 MMHG | HEIGHT: 64 IN | RESPIRATION RATE: 16 BRPM | BODY MASS INDEX: 29.37 KG/M2 | TEMPERATURE: 97.2 F | HEART RATE: 76 BPM

## 2022-03-04 DIAGNOSIS — M05.7A RHEUMATOID ARTHRITIS OF OTHER SITE WITH POSITIVE RHEUMATOID FACTOR (HCC): ICD-10-CM

## 2022-03-04 DIAGNOSIS — R53.82 CHRONIC FATIGUE: ICD-10-CM

## 2022-03-04 DIAGNOSIS — E55.9 VITAMIN D DEFICIENCY: ICD-10-CM

## 2022-03-04 DIAGNOSIS — Z00.00 ANNUAL PHYSICAL EXAM: ICD-10-CM

## 2022-03-04 DIAGNOSIS — R06.83 SNORING: ICD-10-CM

## 2022-03-04 PROBLEM — G89.29 CHRONIC PAIN: Status: ACTIVE | Noted: 2020-01-15

## 2022-03-04 PROCEDURE — 99396 PREV VISIT EST AGE 40-64: CPT | Performed by: FAMILY MEDICINE

## 2022-03-04 RX ORDER — PREDNISONE 20 MG/1
TABLET ORAL
Qty: 12 TABLET | Refills: 0 | Status: SHIPPED | OUTPATIENT
Start: 2022-03-04 | End: 2022-04-08

## 2022-03-04 RX ORDER — TRAMADOL HYDROCHLORIDE 50 MG/1
50 TABLET ORAL 2 TIMES DAILY PRN
Qty: 60 TABLET | Refills: 2 | Status: SHIPPED | OUTPATIENT
Start: 2022-03-04 | End: 2022-04-03

## 2022-03-04 ASSESSMENT — FIBROSIS 4 INDEX: FIB4 SCORE: 1.22

## 2022-03-04 ASSESSMENT — PATIENT HEALTH QUESTIONNAIRE - PHQ9: CLINICAL INTERPRETATION OF PHQ2 SCORE: 0

## 2022-03-04 NOTE — PROGRESS NOTES
Subjective:     CC:   Chief Complaint   Patient presents with   • Rheumatoid Arthritis   • Skin Lesion     back       HPI:   Rowena Tejeda is a 56 y.o. female who presents for annual exam      Patient has GYN provider: Yes  Last Pap Smear: UTD   H/O Abnormal Pap: Patient cannot recall  Last Mammogram: UTD  Last Colorectal Cancer Screening: states UTD  Last Tdap: UTD  Received HPV series: Aged out    Exercise: minimal exercise, one hour walking weekly  Diet: regular      No LMP recorded.  She has not utilized hormone replacement therapy.  Denies any menopausal symptoms.  No significant bloating/fluid retention, pelvic pain, or dyspareunia. No abnormal vaginal discharge.   No breast tenderness, mass, nipple discharge or changes in size or contour.    OB History   No obstetric history on file.      She  has no history on file for sexual activity.    She  has a past medical history of Fluid retention and Snoring.  She  has a past surgical history that includes pr  delivery only (); liposuction (2009); lip injection (2009); and gastric sleeve laparoscopy (2016).    Family History   Problem Relation Age of Onset   • Non-contributory Neg Hx      Social History     Tobacco Use   • Smoking status: Never Smoker   • Smokeless tobacco: Never Used   Vaping Use   • Vaping Use: Never used   Substance Use Topics   • Alcohol use: Not Currently     Alcohol/week: 0.0 oz   • Drug use: No       Patient Active Problem List    Diagnosis Date Noted   • Snoring    • Pure hypercholesterolemia 2020   • Rheumatoid arthritis involving multiple sites (HCC) 07/10/2020   • Neoplasm of uncertain behavior 2020   • Myalgia, unspecified site 2020   • Obesity (BMI 30-39.9) 2020   • RUQ pain 2020     Current Outpatient Medications   Medication Sig Dispense Refill   • traMADol (ULTRAM) 50 MG Tab Take 1 Tablet by mouth 2 times a day as needed for up to 30 days. 60 Tablet 2   • predniSONE  "(DELTASONE) 20 MG Tab Take 3 tabs po for 2 days then 2 tabs for 2 days then 1 for 2 days 12 Tablet 0     No current facility-administered medications for this visit.     No Known Allergies    Review of Systems   Constitutional: Negative for fever, chills and malaise/fatigue.   HENT: Negative for congestion.    Eyes: Negative for pain.   Respiratory: Negative for cough and shortness of breath.    Cardiovascular: Negative for chest pain and leg swelling.   Gastrointestinal: Negative for nausea, vomiting, abdominal pain and diarrhea.   Genitourinary: Negative for dysuria and hematuria.   Skin: Negative for rash.   Neurological: Negative for dizziness, focal weakness and headaches.   Endo/Heme/Allergies: Does not bruise/bleed easily.   Psychiatric/Behavioral: Negative for depression.  The patient is not nervous/anxious.      Objective:   /90 (BP Location: Left arm, Patient Position: Sitting, BP Cuff Size: Adult)   Pulse 76   Temp 36.2 °C (97.2 °F) (Temporal)   Resp 16   Ht 1.626 m (5' 4\")   Wt 78 kg (172 lb)   SpO2 97%   BMI 29.52 kg/m²     Wt Readings from Last 4 Encounters:   03/04/22 78 kg (172 lb)   12/04/20 68 kg (150 lb)   08/28/20 83 kg (183 lb)   06/26/20 80.3 kg (177 lb)           Physical Exam:  Constitutional: Well-developed and well-nourished. Not diaphoretic. No distress.   Skin: Skin is warm and dry. No rash noted.  Head: Atraumatic without lesions.  Eyes: Conjunctivae and extraocular motions are normal. Pupils are equal, round, and reactive to light. No scleral icterus.   Ears:  External ears unremarkable. Tympanic membranes clear and intact.  Nose: Nares patent. Septum midline. Turbinates without erythema nor edema. No discharge.   Mouth/Throat: Tongue normal. Oropharynx is clear and moist. Posterior pharynx without erythema or exudates.  Neck: Supple, trachea midline. Normal range of motion. No thyromegaly present. No lymphadenopathy--cervical or supraclavicular.  Cardiovascular: Regular " rate and rhythm, S1 and S2 without murmur, rubs, or gallops.    Respiratory: Effort normal. Clear to auscultation throughout. No adventitious sounds.     Abdomen: Soft, non tender, and without distention. Active bowel sounds in all four quadrants. No rebound, guarding, masses or HSM.    Extremities: No cyanosis, clubbing, erythema, nor edema. Distal pulses intact and symmetric.   Musculoskeletal: All major joints AROM full in all directions without pain.  Neurological: Alert and oriented x 3. Grossly non-focal. Strength and sensation grossly intact. DTRs 2+/3 and symmetric.   Psychiatric:  Behavior, mood, and affect are appropriate.    Assessment and Plan:     1. Annual physical exam  - Comp Metabolic Panel; Future  - CBC WITH DIFFERENTIAL; Future  - Lipid Profile; Future    2. Snoring  - Referral to Pulmonary and Sleep Medicine    3. Rheumatoid arthritis of other site with positive rheumatoid factor (HCC)  - traMADol (ULTRAM) 50 MG Tab; Take 1 Tablet by mouth 2 times a day as needed for up to 30 days.  Dispense: 60 Tablet; Refill: 2  - predniSONE (DELTASONE) 20 MG Tab; Take 3 tabs po for 2 days then 2 tabs for 2 days then 1 for 2 days  Dispense: 12 Tablet; Refill: 0    4. Chronic fatigue  - TSH+FREE T4  - T3 FREE; Future    5. Vitamin D deficiency  - VITAMIN D,25 HYDROXY; Future      Health maintenance:     Labs per orders  Immunizations per orders  Patient counseled about skin care, diet, supplements, and exercise.  Discussed  colorectal cancer screening     Follow-up: Return in about 6 months (around 9/4/2022) for Reevaluation, labs.

## 2022-03-05 LAB
25(OH)D3+25(OH)D2 SERPL-MCNC: 19 NG/ML (ref 30–100)
ALBUMIN SERPL-MCNC: 4 G/DL (ref 3.8–4.9)
ALBUMIN/GLOB SERPL: 1.5 {RATIO} (ref 1.2–2.2)
ALP SERPL-CCNC: 90 IU/L (ref 44–121)
ALT SERPL-CCNC: 17 IU/L (ref 0–32)
AST SERPL-CCNC: 20 IU/L (ref 0–40)
BASOPHILS # BLD AUTO: 0 X10E3/UL (ref 0–0.2)
BASOPHILS NFR BLD AUTO: 1 %
BILIRUB SERPL-MCNC: 0.6 MG/DL (ref 0–1.2)
BUN SERPL-MCNC: 15 MG/DL (ref 6–24)
BUN/CREAT SERPL: 22 (ref 9–23)
CALCIUM SERPL-MCNC: 9.3 MG/DL (ref 8.7–10.2)
CHLORIDE SERPL-SCNC: 102 MMOL/L (ref 96–106)
CHOLEST SERPL-MCNC: 200 MG/DL (ref 100–199)
CO2 SERPL-SCNC: 25 MMOL/L (ref 20–29)
CREAT SERPL-MCNC: 0.68 MG/DL (ref 0.57–1)
EGFRCR SERPLBLD CKD-EPI 2021: 103 ML/MIN/1.73
EOSINOPHIL # BLD AUTO: 0.1 X10E3/UL (ref 0–0.4)
EOSINOPHIL NFR BLD AUTO: 2 %
ERYTHROCYTE [DISTWIDTH] IN BLOOD BY AUTOMATED COUNT: 13.8 % (ref 11.7–15.4)
GLOBULIN SER CALC-MCNC: 2.7 G/DL (ref 1.5–4.5)
GLUCOSE SERPL-MCNC: 82 MG/DL (ref 65–99)
HCT VFR BLD AUTO: 45.9 % (ref 34–46.6)
HDLC SERPL-MCNC: 65 MG/DL
HGB BLD-MCNC: 15.3 G/DL (ref 11.1–15.9)
IMM GRANULOCYTES # BLD AUTO: 0 X10E3/UL (ref 0–0.1)
IMM GRANULOCYTES NFR BLD AUTO: 0 %
IMMATURE CELLS  115398: NORMAL
LABORATORY COMMENT REPORT: ABNORMAL
LDLC SERPL CALC-MCNC: 98 MG/DL (ref 0–99)
LYMPHOCYTES # BLD AUTO: 2.3 X10E3/UL (ref 0.7–3.1)
LYMPHOCYTES NFR BLD AUTO: 40 %
MCH RBC QN AUTO: 29.3 PG (ref 26.6–33)
MCHC RBC AUTO-ENTMCNC: 33.3 G/DL (ref 31.5–35.7)
MCV RBC AUTO: 88 FL (ref 79–97)
MONOCYTES # BLD AUTO: 0.3 X10E3/UL (ref 0.1–0.9)
MONOCYTES NFR BLD AUTO: 5 %
MORPHOLOGY BLD-IMP: NORMAL
NEUTROPHILS # BLD AUTO: 3 X10E3/UL (ref 1.4–7)
NEUTROPHILS NFR BLD AUTO: 52 %
NRBC BLD AUTO-RTO: NORMAL %
PLATELET # BLD AUTO: 210 X10E3/UL (ref 150–450)
POTASSIUM SERPL-SCNC: 4.5 MMOL/L (ref 3.5–5.2)
PROT SERPL-MCNC: 6.7 G/DL (ref 6–8.5)
RBC # BLD AUTO: 5.23 X10E6/UL (ref 3.77–5.28)
SODIUM SERPL-SCNC: 140 MMOL/L (ref 134–144)
T3FREE SERPL-MCNC: 3.2 PG/ML (ref 2–4.4)
T4 FREE SERPL-MCNC: 1.25 NG/DL (ref 0.82–1.77)
TRIGL SERPL-MCNC: 217 MG/DL (ref 0–149)
TSH SERPL DL<=0.005 MIU/L-ACNC: 1.76 UIU/ML (ref 0.45–4.5)
VLDLC SERPL CALC-MCNC: 37 MG/DL (ref 5–40)
WBC # BLD AUTO: 5.8 X10E3/UL (ref 3.4–10.8)

## 2022-03-08 ENCOUNTER — TELEPHONE (OUTPATIENT)
Dept: MEDICAL GROUP | Age: 57
End: 2022-03-08
Payer: COMMERCIAL

## 2022-03-08 NOTE — TELEPHONE ENCOUNTER
MEDICATION PRIOR AUTHORIZATION NEEDED:    1. Name of Medication: Tramadol    2. Requested By (Name of Pharmacy): [facility]     3. Is insurance on file current? yes    4. What is the name & phone number of the 3rd party payor? Carmine    Cover My Meds Key #  [id number]

## 2022-03-14 ENCOUNTER — TELEPHONE (OUTPATIENT)
Dept: MEDICAL GROUP | Age: 57
End: 2022-03-14
Payer: COMMERCIAL

## 2022-03-14 NOTE — TELEPHONE ENCOUNTER
FINAL PRIOR AUTHORIZATION STATUS:    1.  Name of Medication & Dose: tramadol     2. Prior Auth Status: Approved through 6/6/22     3. Action Taken: Pharmacy Notified: no Patient Notified: no

## 2022-04-08 ENCOUNTER — TELEMEDICINE (OUTPATIENT)
Dept: MEDICAL GROUP | Age: 57
End: 2022-04-08
Payer: COMMERCIAL

## 2022-04-08 VITALS — BODY MASS INDEX: 29.02 KG/M2 | WEIGHT: 170 LBS | HEIGHT: 64 IN

## 2022-04-08 DIAGNOSIS — E55.9 VITAMIN D DEFICIENCY: ICD-10-CM

## 2022-04-08 DIAGNOSIS — E78.2 MIXED HYPERLIPIDEMIA: ICD-10-CM

## 2022-04-08 PROBLEM — E78.00 PURE HYPERCHOLESTEROLEMIA: Status: RESOLVED | Noted: 2020-08-28 | Resolved: 2022-04-08

## 2022-04-08 PROCEDURE — 99214 OFFICE O/P EST MOD 30 MIN: CPT | Performed by: FAMILY MEDICINE

## 2022-04-08 RX ORDER — ROSUVASTATIN CALCIUM 10 MG/1
TABLET, COATED ORAL
Qty: 90 TABLET | Refills: 0 | Status: SHIPPED | OUTPATIENT
Start: 2022-04-08 | End: 2022-04-12 | Stop reason: SDUPTHER

## 2022-04-08 RX ORDER — ADALIMUMAB 40MG/0.4ML
40 KIT SUBCUTANEOUS
COMMUNITY
Start: 2022-04-01 | End: 2023-11-10

## 2022-04-08 ASSESSMENT — FIBROSIS 4 INDEX: FIB4 SCORE: 1.247326071615426721

## 2022-04-08 NOTE — PROGRESS NOTES
Virtual Visit: Established Patient   This visit was conducted via Zoom using secure and encrypted videoconferencing technology.   The patient was in their home in the state [facility].    The patient's identity was confirmed and verbal consent was obtained for this virtual visit.     Subjective:   CC: No chief complaint on file.      Rowena Tejeda is a 56 y.o. female presenting for evaluation and management of:    1. Mixed hyperlipidemia  NEW UNDIAGNOSED PROBLEM    The patient states that she has been eating healthy low-fat low-carb diet so she does not understand why she has elevated triglycerides.  She denies any chest pain no abdominal pain fevers chills night sweats.          results for ROWENA TEJEDA (MRN [id number]) as of 4/8/2022 10:20   Ref. Range 3/4/2022 07:11   Cholesterol,Tot Latest Ref Range: 100 - 199 mg/dL 200 (H)   Triglycerides Latest Ref Range: 0 - 149 mg/dL 217 (H)   HDL Latest Ref Range: >39 mg/dL 65   LDL Chol Calc (NIH) Latest Ref Range: 0 - 99 mg/dL 98   VLDL Cholesterol Calc Latest Ref Range: 5 - 40 mg/dL 37       2. Vitamin D deficiency  NEW UNDIAGNOSED PROBLEM    The patient states he just started supplementing with vitamin D that we prescribed a week ago.    Results for ROWENA TEJEDA (MRN [id number]) as of 4/8/2022 10:20   Ref. Range 3/4/2022 07:11   25-Hydroxy   Vitamin D 25 Latest Ref Range: 30.0 - 100.0 ng/mL 19.0 (L)       ROS     Current medicines (including changes today)  Current Outpatient Medications   Medication Sig Dispense Refill   • rosuvastatin (CRESTOR) 10 MG Tab Take one tab po q48 prn severe anxiety not for daily use 90 Tablet 0     No current facility-administered medications for this visit.       Patient Active Problem List    Diagnosis Date Noted   • Mixed hyperlipidemia 04/08/2022   • Snoring    • Rheumatoid arthritis involving multiple sites ([facility]) 07/10/2020   • Neoplasm of uncertain behavior 06/26/2020   • Myalgia, unspecified site 06/19/2020   • Obesity  "(BMI 30-39.9) 06/19/2020   • RUQ pain 06/19/2020   • Chronic pain 01/15/2020        Objective:   Ht 1.626 m (5' 4\")   Wt 77.1 kg (170 lb)   BMI 29.18 kg/m²     Physical Exam:  Constitutional: Alert, no distress, well-groomed.  Skin: No rashes in visible areas.  Eye: Round. Conjunctiva clear, lids normal. No icterus.   ENMT: Lips pink without lesions, good dentition, moist mucous membranes. Phonation normal.  Neck: No masses, no thyromegaly. Moves freely without pain.  Respiratory: Unlabored respiratory effort, no cough or audible wheeze  Psych: Alert and oriented x3, normal affect and mood.     Assessment and Plan:   The following treatment plan was discussed:     1. Mixed hyperlipidemia    Begin rosuvastatin milligram tab p.o. daily  Repeat labs in 3 to 6 months    Although fenofibrate is more potent at lowering triglycerides 1 statins have shown cardioprotective benefit and her level of triglycerides does not require fenofibrate    - rosuvastatin (CRESTOR) 10 MG Tab; Take one tab po q48 prn severe anxiety not for daily use  Dispense: 90 Tablet; Refill: 0    2. Vitamin D deficiency  - rosuvastatin (CRESTOR) 10 MG Tab; Take one tab po q48 prn severe anxiety not for daily use  Dispense: 90 Tablet; Refill: 0      Follow-up: Return in about 6 months (around 10/8/2022) for Reevaluation, labs.         "

## 2022-04-12 DIAGNOSIS — E78.2 MIXED HYPERLIPIDEMIA: ICD-10-CM

## 2022-04-12 DIAGNOSIS — E55.9 VITAMIN D DEFICIENCY: ICD-10-CM

## 2022-04-12 RX ORDER — ROSUVASTATIN CALCIUM 10 MG/1
TABLET, COATED ORAL
Qty: 90 TABLET | Refills: 0 | Status: SHIPPED | OUTPATIENT
Start: 2022-04-12 | End: 2022-06-29

## 2022-05-09 ENCOUNTER — OFFICE VISIT (OUTPATIENT)
Dept: URGENT CARE | Facility: CLINIC | Age: 57
End: 2022-05-09
Payer: COMMERCIAL

## 2022-05-09 VITALS
SYSTOLIC BLOOD PRESSURE: 126 MMHG | HEART RATE: 85 BPM | TEMPERATURE: 97.7 F | RESPIRATION RATE: 16 BRPM | WEIGHT: 165 LBS | BODY MASS INDEX: 28.17 KG/M2 | OXYGEN SATURATION: 98 % | DIASTOLIC BLOOD PRESSURE: 78 MMHG | HEIGHT: 64 IN

## 2022-05-09 DIAGNOSIS — H57.89 EYE DISCHARGE: ICD-10-CM

## 2022-05-09 DIAGNOSIS — H10.9 BACTERIAL CONJUNCTIVITIS OF BOTH EYES: ICD-10-CM

## 2022-05-09 DIAGNOSIS — B96.89 BACTERIAL CONJUNCTIVITIS OF BOTH EYES: ICD-10-CM

## 2022-05-09 PROCEDURE — 99214 OFFICE O/P EST MOD 30 MIN: CPT

## 2022-05-09 RX ORDER — POLYMYXIN B SULFATE AND TRIMETHOPRIM 1; 10000 MG/ML; [USP'U]/ML
1 SOLUTION OPHTHALMIC EVERY 4 HOURS
Qty: 3 ML | Refills: 0 | Status: SHIPPED | OUTPATIENT
Start: 2022-05-09 | End: 2022-05-16

## 2022-05-09 RX ORDER — ERGOCALCIFEROL 1.25 MG/1
CAPSULE ORAL
COMMUNITY
Start: 2022-04-23 | End: 2022-08-12 | Stop reason: SDUPTHER

## 2022-05-09 RX ORDER — PROGESTERONE 200 MG/1
CAPSULE ORAL
COMMUNITY
Start: 2022-05-02

## 2022-05-09 ASSESSMENT — ENCOUNTER SYMPTOMS
DOUBLE VISION: 0
NEUROLOGICAL NEGATIVE: 1
CONSTITUTIONAL NEGATIVE: 1
GASTROINTESTINAL NEGATIVE: 1
EYE PAIN: 1
BLURRED VISION: 0
CARDIOVASCULAR NEGATIVE: 1
PHOTOPHOBIA: 0
EYE REDNESS: 1
EYE DISCHARGE: 1

## 2022-05-09 ASSESSMENT — FIBROSIS 4 INDEX: FIB4 SCORE: 1.247326071615426721

## 2022-05-10 NOTE — PROGRESS NOTES
Subjective     KAIA Tejeda is a 56 y.o. female who presents with Eye Drainage (X3days, Eyes are all crusty in the morning, itchy )          HPI     Patient reports red eyes for 3 days now. This was accompanied by foreign body sensation, itching, purulent mucus discharge. She reports her grandson had bacterial conjunctivitis, and she took care of him.  She denies any fever, chills, rhinorrhea, cough, nasal congestion, sore throat.    Patient's current problem list, medications, and past medical/surgical history were reviewed in Epic.    PMH:  has a past medical history of Fluid retention and Snoring.  MEDS:   Current Outpatient Medications:   •  vitamin D2, Ergocalciferol, (DRISDOL) 1.25 MG (27472 UT) Cap capsule, TAKE 1 CAPSULE BY MOUTH 1 TIME A WEEK, Disp: , Rfl:   •  progesterone (PROMETRIUM) 200 MG capsule, TAKE 1 CAPSULE BY MOUTH EVERY NIGHT AS DIRECTED, Disp: , Rfl:   •  polymixin-trimethoprim (POLYTRIM) 20300-2.1 UNIT/ML-% Solution, Administer 1 Drop into both eyes every 4 hours for 7 days., Disp: 3 mL, Rfl: 0  •  rosuvastatin (CRESTOR) 10 MG Tab, Take one tab po q24h, Disp: 90 Tablet, Rfl: 0  •  HUMIRA PEN 40 MG/0.4ML Pen-injector Kit, Inject 40 mg under the skin every 7 days., Disp: , Rfl:   ALLERGIES: No Known Allergies  SURGHX:   Past Surgical History:   Procedure Laterality Date   • GASTRIC SLEEVE LAPAROSCOPY  2016    Procedure: GASTRIC SLEEVE LAPAROSCOPY;  Surgeon: Nish White M.D.;  Location: [facility];  Service:    • LIPOSUCTION  2009    Performed by JESSI VARGAS at [facility]   • LIP INJECTION  2009    Performed by JESSI VARGAS at [facility]   • ID  DELIVERY ONLY       SOCHX:  reports that she has never smoked. She has never used smokeless tobacco. She reports previous alcohol use. She reports that she does not use drugs.  FH: Reviewed with patient, not pertinent to this visit.         Review of Systems  "  Constitutional: Negative.    HENT: Negative.    Eyes: Positive for pain, discharge and redness. Negative for blurred vision, double vision and photophobia.   Cardiovascular: Negative.    Gastrointestinal: Negative.    Genitourinary: Negative.    Skin: Negative.    Neurological: Negative.              Objective     /78   Pulse 85   Temp 36.5 °C (97.7 °F) (Temporal)   Resp 16   Ht 1.626 m (5' 4\")   Wt 74.8 kg (165 lb)   LMP 05/07/2016 (Approximate)   SpO2 98%   Breastfeeding No   BMI 28.32 kg/m²      Physical Exam  Constitutional:       Appearance: Normal appearance.   HENT:      Head: Normocephalic.      Nose: Nose normal.   Eyes:      General: Lids are normal.         Right eye: Discharge present.         Left eye: Discharge present.     Conjunctiva/sclera:      Right eye: Right conjunctiva is injected.      Left eye: Left conjunctiva is injected.   Cardiovascular:      Rate and Rhythm: Normal rate and regular rhythm.      Pulses: Normal pulses.      Heart sounds: Normal heart sounds.   Pulmonary:      Effort: Pulmonary effort is normal.      Breath sounds: Normal breath sounds.   Musculoskeletal:         General: Normal range of motion.      Cervical back: Normal range of motion.   Skin:     General: Skin is warm and dry.   Neurological:      General: No focal deficit present.      Mental Status: She is alert.   Psychiatric:         Mood and Affect: Mood normal.         Behavior: Behavior normal.         Judgment: Judgment normal.          Assessment & Plan        1. Bacterial conjunctivitis of both eyes    - polymixin-trimethoprim (POLYTRIM) 99197-7.1 UNIT/ML-% Solution; Administer 1 Drop into both eyes every 4 hours for 7 days.  Dispense: 3 mL; Refill: 0    2. Eye discharge    Patient's presentation is consistent with bacterial conjunctivitis of both eyes.  She is prescribed Polytrim every 4 hours.  Educated on importance of good hand hygiene to prevent transmission of infection.  Discussed " treatment plan with patient, she is agreeable and verbalized understanding.    Differential diagnoses, supportive care, and indications for immediate follow-up discussed with patient. Pathogenesis of diagnosis discussed including typical length and natural progression.     Instructed to return to clinic or nearest emergency department for any change in condition, further concerns, or worsening of symptoms.    Electronically Signed by RADHA Delgado

## 2022-06-28 DIAGNOSIS — E78.2 MIXED HYPERLIPIDEMIA: ICD-10-CM

## 2022-06-28 DIAGNOSIS — E55.9 VITAMIN D DEFICIENCY: ICD-10-CM

## 2022-06-28 NOTE — TELEPHONE ENCOUNTER
Received request via: Pharmacy    Was the patient seen in the last year in this department? Yes 4/8/22    Does the patient have an active prescription (recently filled or refills available) for medication(s) requested? No

## 2022-06-29 RX ORDER — ROSUVASTATIN CALCIUM 10 MG/1
TABLET, COATED ORAL
Qty: 90 TABLET | Refills: 0 | Status: SHIPPED | OUTPATIENT
Start: 2022-06-29 | End: 2022-08-11 | Stop reason: SDUPTHER

## 2022-07-12 ENCOUNTER — TELEMEDICINE (OUTPATIENT)
Dept: MEDICAL GROUP | Age: 57
End: 2022-07-12
Payer: COMMERCIAL

## 2022-07-12 VITALS — BODY MASS INDEX: 28.17 KG/M2 | WEIGHT: 165 LBS | HEIGHT: 64 IN

## 2022-07-12 DIAGNOSIS — Z29.89 NEED FOR MALARIA PROPHYLAXIS: ICD-10-CM

## 2022-07-12 PROCEDURE — 99212 OFFICE O/P EST SF 10 MIN: CPT | Mod: 95 | Performed by: FAMILY MEDICINE

## 2022-07-12 RX ORDER — TRAMADOL HYDROCHLORIDE 50 MG/1
50 TABLET ORAL 2 TIMES DAILY PRN
COMMUNITY
Start: 2022-06-10 | End: 2023-11-10 | Stop reason: SDUPTHER

## 2022-07-12 RX ORDER — DOXYCYCLINE HYCLATE 100 MG
TABLET ORAL
Qty: 120 TABLET | Refills: 0 | Status: SHIPPED | OUTPATIENT
Start: 2022-07-12 | End: 2022-08-11

## 2022-07-12 ASSESSMENT — FIBROSIS 4 INDEX: FIB4 SCORE: 1.247326071615426721

## 2022-07-12 NOTE — PROGRESS NOTES
Virtual Visit: Established Patient   This visit was conducted via Zoom using secure and encrypted videoconferencing technology.   The patient was in their home in the state of [place].    The patient's identity was confirmed and verbal consent was obtained for this virtual visit.     Subjective:   CC: No chief complaint on file.      Rowena Tejeda is a 56 y.o. female presenting for evaluation and management of:    1. Need for malaria prophylaxis  The patient is a very pleasant 56-year-old female who presents today for follow-up to discuss malaria prophylaxis.  She states she will be traveling with her son to [facility] and they need malaria prophylaxis.  She states she is up-to-date on all vaccinations needed for the country she just needs malaria prophylaxis.  She denies any fevers chills night sweats, no nausea or vomiting, no chest pain, no dyspnea on exertion no change in taste or smell.  She denies any abdominal pain no blood in stool or dark tarry stool.    ROS       Current medicines (including changes today)  Current Outpatient Medications   Medication Sig Dispense Refill   • doxycycline (VIBRAMYCIN) 100 MG Tab 100 mg daily; initiate 1 to 2 days prior to travel to endemic area; continue daily during travel and for 4 weeks after leaving endemic area 120 Tablet 0   • traMADol (ULTRAM) 50 MG Tab Take 50 mg by mouth 2 times a day as needed.     • rosuvastatin (CRESTOR) 10 MG Tab TAKE 1 TABLET BY MOUTH EVERY 24 HOURS 90 Tablet 0   • vitamin D2, Ergocalciferol, (DRISDOL) 1.25 MG (76923 UT) Cap capsule TAKE 1 CAPSULE BY MOUTH 1 TIME A WEEK     • progesterone (PROMETRIUM) 200 MG capsule TAKE 1 CAPSULE BY MOUTH EVERY NIGHT AS DIRECTED     • HUMIRA PEN 40 MG/0.4ML Pen-injector Kit Inject 40 mg under the skin every 7 days.       No current facility-administered medications for this visit.       Patient Active Problem List    Diagnosis Date Noted   • Need for malaria prophylaxis 07/12/2022   • Mixed hyperlipidemia  04/08/2022   • Snoring    • Rheumatoid arthritis involving multiple sites (HCC) 07/10/2020   • Neoplasm of uncertain behavior 06/26/2020   • Myalgia, unspecified site 06/19/2020   • Obesity (BMI 30-39.9) 06/19/2020   • RUQ pain 06/19/2020   • Chronic pain 01/15/2020        Objective:   LMP 05/07/2016 (Approximate)     Physical Exam:  Constitutional: Alert, no distress, well-groomed.  Skin: No rashes in visible areas.  Eye: Round. Conjunctiva clear, lids normal. No icterus.   ENMT: Lips pink without lesions, good dentition, moist mucous membranes. Phonation normal.  Neck: No masses, no thyromegaly. Moves freely without pain.  Respiratory: Unlabored respiratory effort, no cough or audible wheeze  Psych: Alert and oriented x3, normal affect and mood.     Assessment and Plan:   The following treatment plan was discussed:     1. Need for malaria prophylaxis    [facility] recommends doxycycline 4 times in a.m.    - doxycycline (VIBRAMYCIN) 100 MG Tab; 100 mg daily; initiate 1 to 2 days prior to travel to endemic area; continue daily during travel and for 4 weeks after leaving endemic area  Dispense: 120 Tablet; Refill: 0      Follow-up: Return in about 6 months (around 1/12/2023) for Reevaluation.

## 2022-08-10 DIAGNOSIS — Z29.89 NEED FOR MALARIA PROPHYLAXIS: ICD-10-CM

## 2022-08-11 ENCOUNTER — PATIENT MESSAGE (OUTPATIENT)
Dept: MEDICAL GROUP | Age: 57
End: 2022-08-11
Payer: COMMERCIAL

## 2022-08-11 DIAGNOSIS — E78.2 MIXED HYPERLIPIDEMIA: ICD-10-CM

## 2022-08-11 DIAGNOSIS — E55.9 VITAMIN D DEFICIENCY: ICD-10-CM

## 2022-08-11 RX ORDER — DOXYCYCLINE HYCLATE 100 MG
TABLET ORAL
Qty: 120 TABLET | Refills: 0 | Status: SHIPPED | OUTPATIENT
Start: 2022-08-11 | End: 2023-11-10

## 2022-08-12 RX ORDER — ERGOCALCIFEROL 1.25 MG/1
CAPSULE ORAL
Qty: 12 CAPSULE | Refills: 0 | Status: SHIPPED | OUTPATIENT
Start: 2022-08-12 | End: 2023-01-04

## 2022-08-12 RX ORDER — ROSUVASTATIN CALCIUM 10 MG/1
TABLET, COATED ORAL
Qty: 90 TABLET | Refills: 0 | Status: SHIPPED | OUTPATIENT
Start: 2022-08-12 | End: 2023-01-05

## 2022-12-30 DIAGNOSIS — E55.9 VITAMIN D DEFICIENCY: ICD-10-CM

## 2023-01-04 DIAGNOSIS — E55.9 VITAMIN D DEFICIENCY: ICD-10-CM

## 2023-01-04 DIAGNOSIS — E78.2 MIXED HYPERLIPIDEMIA: ICD-10-CM

## 2023-01-04 RX ORDER — ERGOCALCIFEROL 1.25 MG/1
CAPSULE ORAL
Qty: 12 CAPSULE | Refills: 0 | Status: SHIPPED | OUTPATIENT
Start: 2023-01-04

## 2023-01-05 RX ORDER — ROSUVASTATIN CALCIUM 10 MG/1
TABLET, COATED ORAL
Qty: 90 TABLET | Refills: 0 | Status: SHIPPED | OUTPATIENT
Start: 2023-01-05 | End: 2023-04-04

## 2023-01-31 ENCOUNTER — TELEPHONE (OUTPATIENT)
Dept: MEDICAL GROUP | Age: 58
End: 2023-01-31
Payer: COMMERCIAL

## 2023-01-31 DIAGNOSIS — E78.00 PURE HYPERCHOLESTEROLEMIA: ICD-10-CM

## 2023-01-31 DIAGNOSIS — Z00.00 ANNUAL PHYSICAL EXAM: ICD-10-CM

## 2023-01-31 DIAGNOSIS — Z11.59 NEED FOR HEPATITIS C SCREENING TEST: ICD-10-CM

## 2023-02-21 ENCOUNTER — APPOINTMENT (OUTPATIENT)
Dept: MEDICAL GROUP | Age: 58
End: 2023-02-21
Payer: COMMERCIAL

## 2023-03-16 ENCOUNTER — OFFICE VISIT (OUTPATIENT)
Dept: MEDICAL GROUP | Age: 58
End: 2023-03-16
Payer: COMMERCIAL

## 2023-03-16 VITALS
HEART RATE: 84 BPM | OXYGEN SATURATION: 98 % | SYSTOLIC BLOOD PRESSURE: 158 MMHG | WEIGHT: 168 LBS | HEIGHT: 64 IN | DIASTOLIC BLOOD PRESSURE: 90 MMHG | TEMPERATURE: 97.7 F | BODY MASS INDEX: 28.68 KG/M2

## 2023-03-16 DIAGNOSIS — Z00.00 ANNUAL PHYSICAL EXAM: ICD-10-CM

## 2023-03-16 DIAGNOSIS — R03.0 ELEVATED BP WITHOUT DIAGNOSIS OF HYPERTENSION: ICD-10-CM

## 2023-03-16 DIAGNOSIS — Z12.11 SCREENING FOR COLON CANCER: ICD-10-CM

## 2023-03-16 LAB
ALBUMIN SERPL-MCNC: 4.4 G/DL (ref 3.8–4.9)
ALBUMIN/GLOB SERPL: 1.6 {RATIO} (ref 1.2–2.2)
ALP SERPL-CCNC: 72 IU/L (ref 44–121)
ALT SERPL-CCNC: 18 IU/L (ref 0–32)
AST SERPL-CCNC: 18 IU/L (ref 0–40)
BASOPHILS # BLD AUTO: 0 X10E3/UL (ref 0–0.2)
BASOPHILS NFR BLD AUTO: 1 %
BILIRUB SERPL-MCNC: 0.7 MG/DL (ref 0–1.2)
BUN SERPL-MCNC: 17 MG/DL (ref 6–24)
BUN/CREAT SERPL: 23 (ref 9–23)
CALCIUM SERPL-MCNC: 9.7 MG/DL (ref 8.7–10.2)
CHLORIDE SERPL-SCNC: 104 MMOL/L (ref 96–106)
CHOLEST SERPL-MCNC: 113 MG/DL (ref 100–199)
CO2 SERPL-SCNC: 24 MMOL/L (ref 20–29)
CREAT SERPL-MCNC: 0.74 MG/DL (ref 0.57–1)
EGFRCR SERPLBLD CKD-EPI 2021: 95 ML/MIN/1.73
EOSINOPHIL # BLD AUTO: 0 X10E3/UL (ref 0–0.4)
EOSINOPHIL NFR BLD AUTO: 1 %
ERYTHROCYTE [DISTWIDTH] IN BLOOD BY AUTOMATED COUNT: 15.5 % (ref 11.7–15.4)
GLOBULIN SER CALC-MCNC: 2.7 G/DL (ref 1.5–4.5)
GLUCOSE SERPL-MCNC: 84 MG/DL (ref 70–99)
HCT VFR BLD AUTO: 45.6 % (ref 34–46.6)
HCV IGG SERPL QL IA: NON REACTIVE
HDLC SERPL-MCNC: 55 MG/DL
HGB BLD-MCNC: 15.4 G/DL (ref 11.1–15.9)
IMM GRANULOCYTES # BLD AUTO: 0 X10E3/UL (ref 0–0.1)
IMM GRANULOCYTES NFR BLD AUTO: 0 %
IMMATURE CELLS  115398: ABNORMAL
LABORATORY COMMENT REPORT: NORMAL
LDLC SERPL CALC-MCNC: 43 MG/DL (ref 0–99)
LYMPHOCYTES # BLD AUTO: 2 X10E3/UL (ref 0.7–3.1)
LYMPHOCYTES NFR BLD AUTO: 33 %
MCH RBC QN AUTO: 28.1 PG (ref 26.6–33)
MCHC RBC AUTO-ENTMCNC: 33.8 G/DL (ref 31.5–35.7)
MCV RBC AUTO: 83 FL (ref 79–97)
MONOCYTES # BLD AUTO: 0.4 X10E3/UL (ref 0.1–0.9)
MONOCYTES NFR BLD AUTO: 6 %
MORPHOLOGY BLD-IMP: ABNORMAL
NEUTROPHILS # BLD AUTO: 3.6 X10E3/UL (ref 1.4–7)
NEUTROPHILS NFR BLD AUTO: 59 %
NRBC BLD AUTO-RTO: ABNORMAL %
PLATELET # BLD AUTO: 230 X10E3/UL (ref 150–450)
POTASSIUM SERPL-SCNC: 3.9 MMOL/L (ref 3.5–5.2)
PROT SERPL-MCNC: 7.1 G/DL (ref 6–8.5)
RBC # BLD AUTO: 5.48 X10E6/UL (ref 3.77–5.28)
SODIUM SERPL-SCNC: 142 MMOL/L (ref 134–144)
T4 FREE SERPL-MCNC: 0.94 NG/DL (ref 0.82–1.77)
TRIGL SERPL-MCNC: 76 MG/DL (ref 0–149)
TSH SERPL DL<=0.005 MIU/L-ACNC: 1.59 UIU/ML (ref 0.45–4.5)
VLDLC SERPL CALC-MCNC: 15 MG/DL (ref 5–40)
WBC # BLD AUTO: 6 X10E3/UL (ref 3.4–10.8)

## 2023-03-16 PROCEDURE — 99396 PREV VISIT EST AGE 40-64: CPT | Performed by: FAMILY MEDICINE

## 2023-03-16 ASSESSMENT — FIBROSIS 4 INDEX: FIB4 SCORE: 1.014544512137220361

## 2023-03-16 ASSESSMENT — PATIENT HEALTH QUESTIONNAIRE - PHQ9: CLINICAL INTERPRETATION OF PHQ2 SCORE: 0

## 2023-03-16 NOTE — RESULT ENCOUNTER NOTE
I discussed results and plan with patient, who stated understanding.       Chalino Mccormick MD  [employer], [address] [address]

## 2023-03-16 NOTE — PROGRESS NOTES
Subjective:     CC:   Chief Complaint   Patient presents with    Annual Exam     Annual physical        HPI:   Rowena Tejeda is a 57 y.o. female who presents for annual exam      1. Annual physical exam  See below      Patient has GYN provider: Yes  Last Pap Smear: OTD   H/O Abnormal Pap: No  Last Mammogram: UTD  Last Colorectal Cancer Screening: UTD  Last Tdap: UTD  Received HPV series: No    Exercise: sporadic irregular exercise, <half hour walking weekly  Diet: regular      Patient's last menstrual period was 2016 (approximate).  She has not utilized hormone replacement therapy.  Denies any menopausal symptoms.  No significant bloating/fluid retention, pelvic pain, or dyspareunia. No abnormal vaginal discharge.   No breast tenderness, mass, nipple discharge or changes in size or contour.           Latest Reference Range & Units 22 07:11 23 10:35   Cholesterol,Tot 100 - 199 mg/dL 200 (H) 113   Triglycerides 0 - 149 mg/dL 217 (H) 76   HDL >39 mg/dL 65 55   LDL Chol Calc (NIH) 0 - 99 mg/dL 98 43   VLDL Cholesterol Calc 5 - 40 mg/dL 37 15   (H): Data is abnormally high     Latest Reference Range & Units 23 10:35   Bun-Creatinine Ratio 9   23   Calcium 8.7 - 10.2 mg/dL 9.7   AST(SGOT) 0 - 40 IU/L 18   ALT(SGPT) 0 - 32 IU/L 18   Alkaline Phosphatase 44 - 121 IU/L 72   Total Bilirubin 0.0 - 1.2 mg/dL 0.7      Latest Reference Range & Units 23 10:35   TSH 0.450 - 4.500 uIU/mL 1.590   Free T-4 0.82 - 1.77 ng/dL 0.94       OB History   No obstetric history on file.      She  has no history on file for sexual activity.    She  has a past medical history of Fluid retention and Snoring.  She  has a past surgical history that includes pr  delivery only (); liposuction (2009); lip injection (2009); and gastric sleeve laparoscopy (2016).    Family History   Problem Relation Age of Onset    Non-contributory Neg Hx      Social History     Tobacco Use    Smoking  status: Never    Smokeless tobacco: Never   Vaping Use    Vaping Use: Never used   Substance Use Topics    Alcohol use: Not Currently     Alcohol/week: 0.0 oz    Drug use: No       Patient Active Problem List    Diagnosis Date Noted    Elevated BP without diagnosis of hypertension 03/16/2023    Need for malaria prophylaxis 07/12/2022    Mixed hyperlipidemia 04/08/2022    Snoring     Rheumatoid arthritis involving multiple sites (HCC) 07/10/2020    Neoplasm of uncertain behavior 06/26/2020    Myalgia, unspecified site 06/19/2020    Obesity (BMI 30-39.9) 06/19/2020    RUQ pain 06/19/2020    Chronic pain 01/15/2020     Current Outpatient Medications   Medication Sig Dispense Refill    rosuvastatin (CRESTOR) 10 MG Tab TAKE 1 TABLET BY MOUTH EVERY 24 HOURS 90 Tablet 0    vitamin D2, Ergocalciferol, (DRISDOL) 1.25 MG (86336 UT) Cap capsule TAKE 1 CAPSULE BY MOUTH 1 TIME A WEEK 12 Capsule 0    traMADol (ULTRAM) 50 MG Tab Take 50 mg by mouth 2 times a day as needed.      progesterone (PROMETRIUM) 200 MG capsule TAKE 1 CAPSULE BY MOUTH EVERY NIGHT AS DIRECTED      HUMIRA PEN 40 MG/0.4ML Pen-injector Kit Inject 40 mg under the skin every 7 days.      doxycycline (VIBRAMYCIN) 100 MG Tab TAKE 100 MG BY MOUTH DAILY, INITIATE 1 TO 2 DAYS PRIOR TO TRAVEL TO ENDEMIC AREA. CONTINUE DAILY USE DURING TRIP AND 4 WEEKS AFTER LEAVING AREA (Patient not taking: Reported on 3/16/2023) 120 Tablet 0     No current facility-administered medications for this visit.     No Known Allergies    Review of Systems   Constitutional: Negative for fever, chills and malaise/fatigue.   HENT: Negative for congestion.    Eyes: Negative for pain.   Respiratory: Negative for cough and shortness of breath.    Cardiovascular: Negative for chest pain and leg swelling.   Gastrointestinal: Negative for nausea, vomiting, abdominal pain and diarrhea.   Genitourinary: Negative for dysuria and hematuria.   Skin: Negative for rash.   Neurological: Negative for  "dizziness, focal weakness and headaches.   Endo/Heme/Allergies: Does not bruise/bleed easily.   Psychiatric/Behavioral: Negative for depression.  The patient is not nervous/anxious.      Objective:   BP (!) 158/90 (BP Location: Left arm, Patient Position: Sitting, BP Cuff Size: Adult)   Pulse 84   Temp 36.5 °C (97.7 °F) (Temporal)   Ht 1.626 m (5' 4\")   Wt 76.2 kg (168 lb)   LMP 05/07/2016 (Approximate)   SpO2 98%   BMI 28.84 kg/m²     Wt Readings from Last 4 Encounters:   03/16/23 76.2 kg (168 lb)   07/12/22 74.8 kg (165 lb)   05/09/22 74.8 kg (165 lb)   04/08/22 77.1 kg (170 lb)           Physical Exam:  Constitutional: Well-developed and well-nourished. Not diaphoretic. No distress.   Skin: Skin is warm and dry. No rash noted.  Head: Atraumatic without lesions.  Eyes: Conjunctivae and extraocular motions are normal. Pupils are equal, round, and reactive to light. No scleral icterus.   Ears:  External ears unremarkable. Tympanic membranes clear and intact.  Nose: Nares patent. Septum midline. Turbinates without erythema nor edema. No discharge.   Mouth/Throat: Tongue normal. Oropharynx is clear and moist. Posterior pharynx without erythema or exudates.  Neck: Supple, trachea midline. Normal range of motion. No thyromegaly present. No lymphadenopathy--cervical or supraclavicular.  Cardiovascular: Regular rate and rhythm, S1 and S2 without murmur, rubs, or gallops.    Respiratory: Effort normal. Clear to auscultation throughout. No adventitious sounds.     Abdomen: Soft, non tender, and without distention. Active bowel sounds in all four quadrants. No rebound, guarding, masses or HSM.    Extremities: No cyanosis, clubbing, erythema, nor edema. Distal pulses intact and symmetric.   Musculoskeletal: All major joints AROM full in all directions without pain.  Neurological: Alert and oriented x 3. Grossly non-focal. Strength and sensation grossly intact. DTRs 2+/3 and symmetric.   Psychiatric:  Behavior, mood, " and affect are appropriate.    Assessment and Plan:     1. Annual physical exam    She is up-to-date on her mammogram  She was not able to do the colonoscopy prep so she chooses to proceed with Cologuard now  She is up-to-date on her Pap smear    2. Screening for colon cancer  - COLOGUARD COLON CANCER SCREENING    3. Elevated BP without diagnosis of hypertension    We will make a 3-week BP log and return back to clinic  Instructions given on how to take blood pressure and which machine to buy    We reviewed anticipatory guidelines    Health maintenance:     Labs per orders  Immunizations per orders  Patient counseled about skin care, diet, supplements, and exercise.  Discussed  breast self exam     Follow-up: Return for Reevaluation, 3-4 week BP log.

## 2023-04-04 DIAGNOSIS — E55.9 VITAMIN D DEFICIENCY: ICD-10-CM

## 2023-04-04 DIAGNOSIS — E78.2 MIXED HYPERLIPIDEMIA: ICD-10-CM

## 2023-04-04 RX ORDER — ROSUVASTATIN CALCIUM 10 MG/1
TABLET, COATED ORAL
Qty: 90 TABLET | Refills: 0 | Status: SHIPPED | OUTPATIENT
Start: 2023-04-04 | End: 2023-07-05

## 2023-04-18 DIAGNOSIS — R19.5 POSITIVE COLORECTAL CANCER SCREENING USING COLOGUARD TEST: ICD-10-CM

## 2023-04-27 ENCOUNTER — OFFICE VISIT (OUTPATIENT)
Dept: MEDICAL GROUP | Age: 58
End: 2023-04-27
Payer: COMMERCIAL

## 2023-04-27 VITALS
WEIGHT: 173 LBS | HEART RATE: 89 BPM | SYSTOLIC BLOOD PRESSURE: 126 MMHG | OXYGEN SATURATION: 97 % | HEIGHT: 64 IN | DIASTOLIC BLOOD PRESSURE: 68 MMHG | TEMPERATURE: 97.6 F | BODY MASS INDEX: 29.53 KG/M2

## 2023-04-27 DIAGNOSIS — R03.0 ELEVATED BP WITHOUT DIAGNOSIS OF HYPERTENSION: ICD-10-CM

## 2023-04-27 DIAGNOSIS — R19.5 POSITIVE COLORECTAL CANCER SCREENING USING COLOGUARD TEST: ICD-10-CM

## 2023-04-27 PROCEDURE — 99214 OFFICE O/P EST MOD 30 MIN: CPT | Performed by: FAMILY MEDICINE

## 2023-04-27 ASSESSMENT — FIBROSIS 4 INDEX: FIB4 SCORE: 1.014544512137220361

## 2023-04-27 NOTE — PROGRESS NOTES
This medical record contains text that has been entered with the assistance of computer voice recognition and dictation software.  Therefore, it may contain unintended errors in text, spelling, punctuation, or grammar      Chief Complaint   Patient presents with    Hypertension Follow-up    Follow-Up     Cologuard questions          Rowena Tejeda is a 57 y.o. female here evaluation and management of: Follow-up on Cologuard results, BP check      HPI:           1. Positive colorectal cancer screening using Cologuard test  We did obtain a Cologuard which was positive for the patient.  I did refer her to gastroenterology she has not heard anything yet she states.  She denies any change in the caliber of her stool, no blood in the stool no dark tarry stool no unintentional weight loss.    2. Elevated BP without diagnosis of hypertension  Patient comes back today stating her blood pressure has been normal at home.  She is not sure what happened last time she was here.  She does not take any BP meds and it is normal.              Current medicines (including changes today)  Current Outpatient Medications   Medication Sig Dispense Refill    rosuvastatin (CRESTOR) 10 MG Tab TAKE 1 TABLET BY MOUTH EVERY 24 HOURS 90 Tablet 0    vitamin D2, Ergocalciferol, (DRISDOL) 1.25 MG (33382 UT) Cap capsule TAKE 1 CAPSULE BY MOUTH 1 TIME A WEEK 12 Capsule 0    doxycycline (VIBRAMYCIN) 100 MG Tab TAKE 100 MG BY MOUTH DAILY, INITIATE 1 TO 2 DAYS PRIOR TO TRAVEL TO ENDEMIC AREA. CONTINUE DAILY USE DURING TRIP AND 4 WEEKS AFTER LEAVING AREA (Patient not taking: Reported on 3/16/2023) 120 Tablet 0    traMADol (ULTRAM) 50 MG Tab Take 50 mg by mouth 2 times a day as needed.      progesterone (PROMETRIUM) 200 MG capsule TAKE 1 CAPSULE BY MOUTH EVERY NIGHT AS DIRECTED      HUMIRA PEN 40 MG/0.4ML Pen-injector Kit Inject 40 mg under the skin every 7 days.       No current facility-administered medications for this visit.     She  has a past  "medical history of Fluid retention and Snoring.  She  has a past surgical history that includes pr  delivery only (); liposuction (2009); lip injection (2009); and gastric sleeve laparoscopy (2016).  Social History     Tobacco Use    Smoking status: Never    Smokeless tobacco: Never   Vaping Use    Vaping Use: Never used   Substance Use Topics    Alcohol use: Not Currently     Alcohol/week: 0.0 oz    Drug use: No     Social History     Social History Narrative    Not on file     Family History   Problem Relation Age of Onset    Non-contributory Neg Hx      No family status information on file.         ROS    The pertinent  ROS findings can be seen in the HPI above.     All other systems reviewed and are negative     Objective:     /68 (BP Location: Left arm, Patient Position: Sitting, BP Cuff Size: Adult)   Pulse 89   Temp 36.4 °C (97.6 °F) (Temporal)   Ht 1.626 m (5' 4\")   Wt 78.5 kg (173 lb)   SpO2 97%  Body mass index is 29.7 kg/m².      Physical Exam:    Constitutional: Alert, no distress.  Skin: No suspicious lesions  Eye: Equal, round and reactive, conjunctiva clear, lids normal.  ENMT: Lips without lesions, good dentition, oropharynx clear.  Neck: Trachea midline, no masses, no thyromegaly. No cervical or supraclavicular lymphadenopathy.  Respiratory: Unlabored respiratory effort, lungs clear to auscultation, no wheezes, no ronchi.  Cardiovascular: Normal S1, S2, no murmur, no edema  Abdomen: Soft, non-tender, no masses, no hepatosplenomegaly.   Latest Reference Range & Units 23 10:35   TSH 0.450 - 4.500 uIU/mL 1.590   Free T-4 0.82 - 1.77 ng/dL 0.94      Latest Reference Range & Units 22 07:11 23 10:35   Cholesterol,Tot 100 - 199 mg/dL 200 (H) 113   Triglycerides 0 - 149 mg/dL 217 (H) 76   HDL >39 mg/dL 65 55   LDL Chol Calc (NIH) 0 - 99 mg/dL 98 43   VLDL Cholesterol Calc 5 - 40 mg/dL 37 15   (H): Data is abnormally high    Assessment and Plan: "   The following treatment plan was discussed    All recent labs and provider notes reviewed    1. Positive colorectal cancer screening using Cologuard test    We discussed that the next up is a colonoscopy  She will call [phone] regarding her referral    2. Elevated BP without diagnosis of hypertension  Continue to monitor blood pressure routinely.  We discussed the DASH diet  We discussed exercise            Instructed to Follow up in clinic or ER for worsening symptoms, difficulty breathing, lack of expected recovery, or should new symptoms or problems arise.    Followup: Return in about 6 months (around 10/27/2023) for Reevaluation, labs.

## 2023-05-25 ENCOUNTER — TELEPHONE (OUTPATIENT)
Dept: MEDICAL GROUP | Age: 58
End: 2023-05-25
Payer: COMMERCIAL

## 2023-05-25 NOTE — TELEPHONE ENCOUNTER
Rowena left me a voicemail saying that [facility] isn't within the network for [place]. Can we send her a referral for [facility] instead, or any other GI group in town.

## 2023-07-03 DIAGNOSIS — E78.2 MIXED HYPERLIPIDEMIA: ICD-10-CM

## 2023-07-03 DIAGNOSIS — E55.9 VITAMIN D DEFICIENCY: ICD-10-CM

## 2023-07-05 RX ORDER — ROSUVASTATIN CALCIUM 10 MG/1
TABLET, COATED ORAL
Qty: 90 TABLET | Refills: 0 | Status: SHIPPED | OUTPATIENT
Start: 2023-07-05 | End: 2023-08-16

## 2023-08-08 DIAGNOSIS — M05.7A RHEUMATOID ARTHRITIS OF OTHER SITE WITH POSITIVE RHEUMATOID FACTOR (HCC): ICD-10-CM

## 2023-11-10 ENCOUNTER — OFFICE VISIT (OUTPATIENT)
Dept: MEDICAL GROUP | Age: 58
End: 2023-11-10
Payer: COMMERCIAL

## 2023-11-10 VITALS
HEIGHT: 64 IN | SYSTOLIC BLOOD PRESSURE: 122 MMHG | WEIGHT: 153 LBS | HEART RATE: 90 BPM | BODY MASS INDEX: 26.12 KG/M2 | OXYGEN SATURATION: 98 % | TEMPERATURE: 97.2 F | DIASTOLIC BLOOD PRESSURE: 72 MMHG

## 2023-11-10 DIAGNOSIS — E55.9 VITAMIN D DEFICIENCY: ICD-10-CM

## 2023-11-10 DIAGNOSIS — D23.5 DERMOID CYST OF SKIN OF BACK: ICD-10-CM

## 2023-11-10 DIAGNOSIS — G89.29 OTHER CHRONIC PAIN: ICD-10-CM

## 2023-11-10 PROCEDURE — 3078F DIAST BP <80 MM HG: CPT | Performed by: FAMILY MEDICINE

## 2023-11-10 PROCEDURE — 99214 OFFICE O/P EST MOD 30 MIN: CPT | Performed by: FAMILY MEDICINE

## 2023-11-10 PROCEDURE — 3074F SYST BP LT 130 MM HG: CPT | Performed by: FAMILY MEDICINE

## 2023-11-10 RX ORDER — ERGOCALCIFEROL 1.25 MG/1
50000 CAPSULE ORAL
Qty: 7 CAPSULE | Refills: 0 | Status: SHIPPED | OUTPATIENT
Start: 2023-11-10

## 2023-11-10 RX ORDER — TRAMADOL HYDROCHLORIDE 50 MG/1
50 TABLET ORAL 2 TIMES DAILY PRN
Qty: 120 TABLET | Refills: 0 | Status: SHIPPED | OUTPATIENT
Start: 2023-11-10 | End: 2024-01-09

## 2023-11-10 RX ORDER — ERGOCALCIFEROL 1.25 MG/1
CAPSULE ORAL
Qty: 12 CAPSULE | Refills: 0 | Status: CANCELLED | OUTPATIENT
Start: 2023-11-10

## 2023-11-10 ASSESSMENT — FIBROSIS 4 INDEX: FIB4 SCORE: 1.014544512137220361

## 2023-11-10 NOTE — PROGRESS NOTES
This medical record contains text that has been entered with the assistance of computer voice recognition and dictation software.  Therefore, it may contain unintended errors in text, spelling, punctuation, or grammar      Chief Complaint   Patient presents with    Follow-Up     6 month     Cyst         Rowena Tejeda is a 57 y.o. female here evaluation and management of: Cyst, 6-month follow-up      HPI:           1. Other chronic pain  Rowena is a very pleasant 56-year-old female who takes tramadol every now and then as needed, 120 tablets for the last 6 months.  Is a history of rheumatoid arthritis chronic back pain and multijoint pain.  The patient has no history of opiate-induced constipation, no history of respiratory depression no early refills.    2. Dermoid cyst of skin of back  Patient states that the cyst that we removed on her mid upper back has returned little bit and its causing discomfort.      3. Vitamin D deficiency  Patient has been using vitamin D 50,000 units once weekly.  She would like a refill.      Current medicines (including changes today)  Current Outpatient Medications   Medication Sig Dispense Refill    traMADol (ULTRAM) 50 MG Tab Take 1 Tablet by mouth 2 times a day as needed for Mild Pain for up to 60 days. 120 Tablet 0    ergocalciferol (DRISDOL) 67936 UNIT capsule Take 1 Capsule by mouth every 7 days. 7 Capsule 0    rosuvastatin (CRESTOR) 10 MG Tab TAKE 1 TABLET BY MOUTH EVERY 24 HOURS 100 Tablet 2    vitamin D2, Ergocalciferol, (DRISDOL) 1.25 MG (48072 UT) Cap capsule TAKE 1 CAPSULE BY MOUTH 1 TIME A WEEK 12 Capsule 0    progesterone (PROMETRIUM) 200 MG capsule TAKE 1 CAPSULE BY MOUTH EVERY NIGHT AS DIRECTED       No current facility-administered medications for this visit.     She  has a past medical history of Fluid retention and Snoring.  She  has a past surgical history that includes pr  delivery only (); liposuction (2009); lip injection (2009); and  "gastric sleeve laparoscopy (5/31/2016).  Social History     Tobacco Use    Smoking status: Never    Smokeless tobacco: Never   Vaping Use    Vaping Use: Never used   Substance Use Topics    Alcohol use: Not Currently     Alcohol/week: 0.0 oz    Drug use: No     Social History     Social History Narrative    Not on file     Family History   Problem Relation Age of Onset    Non-contributory Neg Hx      No family status information on file.         ROS    The pertinent  ROS findings can be seen in the HPI above.     All other systems reviewed and are negative     Objective:     /72 (BP Location: Right arm, Patient Position: Sitting, BP Cuff Size: Adult)   Pulse 90   Temp 36.2 °C (97.2 °F) (Temporal)   Ht 1.626 m (5' 4\")   Wt 69.4 kg (153 lb)   SpO2 98%  Body mass index is 26.26 kg/m².      Physical Exam:    Constitutional: Alert, no distress.  Skin: Mid back reveals a very minimal raised cystic mass nontender to touch not warm not red  Eye: Equal, round and reactive, conjunctiva clear, lids normal.  ENMT: Lips without lesions, good dentition, oropharynx clear.  Neck: Trachea midline, no masses, no thyromegaly. No cervical or supraclavicular lymphadenopathy.  Respiratory: Unlabored respiratory effort, lungs clear to auscultation, no wheezes, no ronchi.  Cardiovascular: Normal S1, S2, no murmur, no edema  Abdomen: Soft, non-tender, no masses, no hepatosplenomegaly.        Assessment and Plan:   The following treatment plan was discussed    All recent labs and provider notes reviewed    1. Other chronic pain    Patient has been stable with current management  We will make no changes for now    - traMADol (ULTRAM) 50 MG Tab; Take 1 Tablet by mouth 2 times a day as needed for Mild Pain for up to 60 days.  Dispense: 120 Tablet; Refill: 0    2. Dermoid cyst of skin of back    We will refer to surgery since it will likely need a deeper excision.    - Referral to General Surgery    3. Vitamin D deficiency    Other " orders  - ergocalciferol (DRISDOL) 73069 UNIT capsule; Take 1 Capsule by mouth every 7 days.  Dispense: 7 Capsule; Refill: 0             Instructed to Follow up in clinic or ER for worsening symptoms, difficulty breathing, lack of expected recovery, or should new symptoms or problems arise.    Followup: Return in about 6 months (around 5/10/2024) for Reevaluation, labs.

## 2024-02-08 ENCOUNTER — TELEPHONE (OUTPATIENT)
Dept: MEDICAL GROUP | Age: 59
End: 2024-02-08
Payer: COMMERCIAL

## 2024-02-08 DIAGNOSIS — M05.7A RHEUMATOID ARTHRITIS OF OTHER SITE WITH POSITIVE RHEUMATOID FACTOR (HCC): ICD-10-CM

## 2024-02-08 NOTE — TELEPHONE ENCOUNTER
PT needs a new referral placed for Rheumatology specifically for [facility]. Please place this. Thanks.

## 2024-05-30 DIAGNOSIS — E55.9 VITAMIN D DEFICIENCY: ICD-10-CM

## 2024-05-30 DIAGNOSIS — G89.29 OTHER CHRONIC PAIN: ICD-10-CM

## 2024-05-30 RX ORDER — TRAMADOL HYDROCHLORIDE 50 MG/1
50 TABLET ORAL 2 TIMES DAILY PRN
Qty: 120 TABLET | OUTPATIENT
Start: 2024-05-30

## 2024-05-30 RX ORDER — ERGOCALCIFEROL 1.25 MG/1
CAPSULE ORAL
Qty: 12 CAPSULE | Refills: 0 | Status: SHIPPED | OUTPATIENT
Start: 2024-05-30

## 2024-06-10 DIAGNOSIS — E78.2 MIXED HYPERLIPIDEMIA: ICD-10-CM

## 2024-06-10 DIAGNOSIS — E55.9 VITAMIN D DEFICIENCY: ICD-10-CM

## 2024-06-11 RX ORDER — ROSUVASTATIN CALCIUM 10 MG/1
TABLET, COATED ORAL
Qty: 100 TABLET | Refills: 2 | Status: SHIPPED | OUTPATIENT
Start: 2024-06-11

## 2024-08-26 DIAGNOSIS — E55.9 VITAMIN D DEFICIENCY: ICD-10-CM

## 2024-08-27 RX ORDER — ERGOCALCIFEROL 1.25 MG/1
CAPSULE, LIQUID FILLED ORAL
Qty: 12 CAPSULE | Refills: 0 | Status: SHIPPED | OUTPATIENT
Start: 2024-08-27

## 2024-11-20 DIAGNOSIS — E55.9 VITAMIN D DEFICIENCY: ICD-10-CM

## 2024-11-20 RX ORDER — ERGOCALCIFEROL 1.25 MG/1
CAPSULE, LIQUID FILLED ORAL
Qty: 12 CAPSULE | Refills: 0 | Status: SHIPPED | OUTPATIENT
Start: 2024-11-20 | End: 2024-11-22 | Stop reason: SDUPTHER

## 2024-11-21 ENCOUNTER — APPOINTMENT (OUTPATIENT)
Dept: MEDICAL GROUP | Age: 59
End: 2024-11-21
Payer: COMMERCIAL

## 2024-11-22 ENCOUNTER — OFFICE VISIT (OUTPATIENT)
Dept: MEDICAL GROUP | Age: 59
End: 2024-11-22
Payer: COMMERCIAL

## 2024-11-22 VITALS
HEIGHT: 64 IN | BODY MASS INDEX: 21.85 KG/M2 | TEMPERATURE: 97.2 F | WEIGHT: 128 LBS | HEART RATE: 80 BPM | OXYGEN SATURATION: 100 % | SYSTOLIC BLOOD PRESSURE: 114 MMHG | DIASTOLIC BLOOD PRESSURE: 70 MMHG

## 2024-11-22 DIAGNOSIS — G89.29 OTHER CHRONIC PAIN: ICD-10-CM

## 2024-11-22 DIAGNOSIS — E55.9 VITAMIN D DEFICIENCY: ICD-10-CM

## 2024-11-22 DIAGNOSIS — K21.9 GASTROESOPHAGEAL REFLUX DISEASE WITHOUT ESOPHAGITIS: ICD-10-CM

## 2024-11-22 DIAGNOSIS — M05.79 RHEUMATOID ARTHRITIS INVOLVING MULTIPLE SITES WITH POSITIVE RHEUMATOID FACTOR (HCC): ICD-10-CM

## 2024-11-22 DIAGNOSIS — E78.2 MIXED HYPERLIPIDEMIA: ICD-10-CM

## 2024-11-22 PROCEDURE — 3078F DIAST BP <80 MM HG: CPT | Performed by: FAMILY MEDICINE

## 2024-11-22 PROCEDURE — 99214 OFFICE O/P EST MOD 30 MIN: CPT | Performed by: FAMILY MEDICINE

## 2024-11-22 PROCEDURE — 3074F SYST BP LT 130 MM HG: CPT | Performed by: FAMILY MEDICINE

## 2024-11-22 RX ORDER — PANTOPRAZOLE SODIUM 20 MG/1
20 TABLET, DELAYED RELEASE ORAL DAILY
Qty: 90 TABLET | Refills: 0 | Status: SHIPPED | OUTPATIENT
Start: 2024-11-22

## 2024-11-22 RX ORDER — LEFLUNOMIDE 10 MG/1
1 TABLET ORAL
COMMUNITY

## 2024-11-22 RX ORDER — TRAMADOL HYDROCHLORIDE 50 MG/1
50 TABLET ORAL 2 TIMES DAILY PRN
Qty: 120 TABLET | Refills: 0 | Status: SHIPPED | OUTPATIENT
Start: 2024-11-22 | End: 2025-01-21

## 2024-11-22 RX ORDER — ERGOCALCIFEROL 1.25 MG/1
50000 CAPSULE ORAL
Qty: 7 CAPSULE | Refills: 0 | Status: SHIPPED | OUTPATIENT
Start: 2024-11-22

## 2024-11-22 RX ORDER — ROSUVASTATIN CALCIUM 10 MG/1
TABLET, COATED ORAL
Qty: 100 TABLET | Refills: 2 | Status: SHIPPED | OUTPATIENT
Start: 2024-11-22

## 2024-11-22 RX ORDER — ERGOCALCIFEROL 1.25 MG/1
CAPSULE, LIQUID FILLED ORAL
Qty: 12 CAPSULE | Refills: 0 | Status: SHIPPED | OUTPATIENT
Start: 2024-11-22

## 2024-11-22 ASSESSMENT — PATIENT HEALTH QUESTIONNAIRE - PHQ9: CLINICAL INTERPRETATION OF PHQ2 SCORE: 0

## 2024-11-22 ASSESSMENT — FIBROSIS 4 INDEX: FIB4 SCORE: 1.03

## 2024-11-22 NOTE — PROGRESS NOTES
This medical record contains text that has been entered with the assistance of computer voice recognition and dictation software.  Therefore, it may contain unintended errors in text, spelling, punctuation, or grammar      Verbal consent was acquired by the patient to use [employer] ambient listening note generation during this visit Yes       Chief Complaint   Patient presents with    Follow-Up     Referral for arthritis, heartburn, med refills          Rowena Tejeda is a 58 y.o. female here evaluation and management of: follow up      HPI:           1. Rheumatoid arthritis involving multiple sites with positive rheumatoid factor (HCC)  2. Gastroesophageal reflux disease without esophagitis  3. Other chronic pain  4. Vitamin D deficiency  5. Mixed hyperlipidemia  History of Present Illness  The patient is a 58-year-old female who presents for evaluation of multiple medical concerns.    She reports experiencing severe heartburn, to the extent that she is unable to consume even a sandwich. Over-the-counter medications have proven ineffective in alleviating her symptoms. She also mentions that a glass of wine triggers her heartburn.    She has experienced significant weight loss, dropping from 170 pounds in April 2023 to 150 pounds in October 2023, and currently weighing 125 pounds. She attributes this to a healthy diet and reduced food intake, eating lightly five times a day. 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    She is currently on rosuvastatin for cholesterol management and is questioning whether she should continue the medication given her normal cholesterol levels.    She needs to follow up with her arthritis doctor but wants to be seen sooner, so she prefers to see the doctor's assistant because the doctor is booked out for six months. She missed her last appointment because she was in [place] for her son's road setup. She is taking Leflunomide for RA She previously tried Humira,  "but it was not effective. She has completed her mammogram.        Current medicines (including changes today)  Current Outpatient Medications   Medication Sig Dispense Refill    traMADol (ULTRAM) 50 MG Tab Take 1 Tablet by mouth 2 times a day as needed for Mild Pain for up to 60 days. 120 Tablet 0    ergocalciferol (DRISDOL) 85954 UNIT capsule Take 1 Capsule by mouth every 7 days. 7 Capsule 0    vitamin D2, Ergocalciferol, (DRISDOL) 1.25 MG (07310 UT) Cap capsule TAKE 1 CAPSULE BY MOUTH 1 TIME A WEEK 12 Capsule 0    rosuvastatin (CRESTOR) 10 MG Tab TAKE 1 TABLET BY MOUTH EVERY 24 HOURS 100 Tablet 2    pantoprazole (PROTONIX) 20 MG tablet Take 1 Tablet by mouth every day. 90 Tablet 0    progesterone (PROMETRIUM) 200 MG capsule TAKE 1 CAPSULE BY MOUTH EVERY NIGHT AS DIRECTED      leflunomide (ARAVA) 10 MG Tab Take 1 Tablet by mouth every day.       No current facility-administered medications for this visit.     She  has a past medical history of Fluid retention and Snoring.  She  has a past surgical history that includes pr  delivery only (); liposuction (2009); lip injection (2009); and gastric sleeve laparoscopy (2016).  Social History     Tobacco Use    Smoking status: Never    Smokeless tobacco: Never   Vaping Use    Vaping status: Never Used   Substance Use Topics    Alcohol use: Not Currently     Alcohol/week: 0.0 oz    Drug use: No     Social History     Social History Narrative    Not on file     Family History   Problem Relation Age of Onset    Non-contributory Neg Hx      No family status information on file.         ROS    The pertinent  ROS findings can be seen in the HPI above.     All other systems reviewed and are negative     Objective:     /70 (BP Location: Right arm, Patient Position: Sitting, BP Cuff Size: Adult)   Pulse 80   Temp 36.2 °C (97.2 °F) (Temporal)   Ht 1.626 m (5' 4\")   Wt 58.1 kg (128 lb)   SpO2 100%  Body mass index is 21.97 kg/m².    "   Physical Exam:    Constitutional: Alert, no distress.  Skin: No suspicious lesions  Eye: Equal, round and reactive, conjunctiva clear, lids normal.  ENMT: Lips without lesions, good dentition, oropharynx clear.  Neck: Trachea midline, no masses, no thyromegaly. No cervical or supraclavicular lymphadenopathy.  Respiratory: Unlabored respiratory effort, lungs clear to auscultation, no wheezes, no ronchi.  Cardiovascular: Normal S1, S2, no murmur, no edema  Abdomen: Soft, non-tender, no masses, no hepatosplenomegaly.        Assessment and Plan:   The following treatment plan was discussed    All recent labs and provider notes reviewed    Assessment & Plan  1. Heartburn.  She reports experiencing severe heartburn that is not relieved by over-the-counter medications. [facility]. She is advised to take the medication as needed.    2. Hypercholesterolemia.  Her cholesterol levels are within the normal range due to the rosuvastatin therapy. Continuation of rosuvastatin is recommended.    3. Rheumatoid Arthritis.  She reports that her current medication, Leflunomide, is not effectively managing her arthritis symptoms. She has previously tried Humira without success. A referral to her rheumatologist is needed as her current referral has . She is advised to discuss alternative treatment options with her arthritis specialist.    4. Health Maintenance.  A Pap smear is recommended for routine health maintenance. She has completed her mammogram and is awaiting results.          1. Rheumatoid arthritis involving multiple sites with positive rheumatoid factor (HCC)  - Referral to Rheumatology    2. Gastroesophageal reflux disease without esophagitis  - pantoprazole (PROTONIX) 20 MG tablet; Take 1 Tablet by mouth every day.  Dispense: 90 Tablet; Refill: 0    3. Other chronic pain  - traMADol (ULTRAM) 50 MG Tab; Take 1 Tablet by mouth 2 times a day as needed  for Mild Pain for up to 60 days.  Dispense: 120 Tablet; Refill: 0    4. Vitamin D deficiency  - vitamin D2, Ergocalciferol, (DRISDOL) 1.25 MG (03250 UT) Cap capsule; TAKE 1 CAPSULE BY MOUTH 1 TIME A WEEK  Dispense: 12 Capsule; Refill: 0  - VITAMIN D,25 HYDROXY (DEFICIENCY); Future    5. Mixed hyperlipidemia  - rosuvastatin (CRESTOR) 10 MG Tab; TAKE 1 TABLET BY MOUTH EVERY 24 HOURS  Dispense: 100 Tablet; Refill: 2  - CBC WITH DIFFERENTIAL; Future  - Comp Metabolic Panel; Future  - Lipid Profile; Future  - TSH+FREE T4  - T3 FREE; Future    Other orders  - ergocalciferol (DRISDOL) 94921 UNIT capsule; Take 1 Capsule by mouth every 7 days.  Dispense: 7 Capsule; Refill: 0  - leflunomide (ARAVA) 10 MG Tab; Take 1 Tablet by mouth every day.             Instructed to Follow up in clinic or ER for worsening symptoms, difficulty breathing, lack of expected recovery, or should new symptoms or problems arise.    Followup: Return in about 6 months (around 5/22/2025) for Reevaluation, labs.

## 2024-12-04 ENCOUNTER — TELEPHONE (OUTPATIENT)
Dept: MEDICAL GROUP | Age: 59
End: 2024-12-04
Payer: COMMERCIAL

## 2024-12-04 NOTE — TELEPHONE ENCOUNTER
Patients Rheumatology Office called requesting a new referral but it was sent in on 11/22/2024 and they have not received anything. I informed the office I would fax the referral and last note to their office stating that it was spoken about at her last visit

## 2024-12-12 ENCOUNTER — OFFICE VISIT (OUTPATIENT)
Dept: MEDICAL GROUP | Age: 59
End: 2024-12-12
Payer: COMMERCIAL

## 2024-12-12 VITALS
HEART RATE: 82 BPM | HEIGHT: 64 IN | OXYGEN SATURATION: 95 % | TEMPERATURE: 97.2 F | WEIGHT: 130 LBS | SYSTOLIC BLOOD PRESSURE: 140 MMHG | BODY MASS INDEX: 22.2 KG/M2 | DIASTOLIC BLOOD PRESSURE: 76 MMHG

## 2024-12-12 DIAGNOSIS — E78.2 MIXED HYPERLIPIDEMIA: ICD-10-CM

## 2024-12-12 DIAGNOSIS — K21.9 GASTROESOPHAGEAL REFLUX DISEASE WITHOUT ESOPHAGITIS: ICD-10-CM

## 2024-12-12 DIAGNOSIS — R79.89 ELEVATED LFTS: ICD-10-CM

## 2024-12-12 PROCEDURE — 3077F SYST BP >= 140 MM HG: CPT | Performed by: FAMILY MEDICINE

## 2024-12-12 PROCEDURE — 3078F DIAST BP <80 MM HG: CPT | Performed by: FAMILY MEDICINE

## 2024-12-12 PROCEDURE — 99214 OFFICE O/P EST MOD 30 MIN: CPT | Performed by: FAMILY MEDICINE

## 2024-12-12 RX ORDER — PANTOPRAZOLE SODIUM 20 MG/1
20 TABLET, DELAYED RELEASE ORAL DAILY
Qty: 90 TABLET | Refills: 0 | Status: SHIPPED | OUTPATIENT
Start: 2024-12-12

## 2024-12-12 ASSESSMENT — FIBROSIS 4 INDEX: FIB4 SCORE: 2.5

## 2024-12-12 NOTE — PROGRESS NOTES
This medical record contains text that has been entered with the assistance of computer voice recognition and dictation software.  Therefore, it may contain unintended errors in text, spelling, punctuation, or grammar      Verbal consent was acquired by the patient to use [email or web address] ambient listening note generation during this visit Yes       Chief Complaint   Patient presents with    Lab Results     LAB REVIEW         Rowena Tejeda is a 59 y.o. female here evaluation and management of: labs      HPI:           1. Gastroesophageal reflux disease without esophagitis      2. Mixed hyperlipidemia      3. Elevated LFTs    History of Present Illness  The patient is a 59-year-old female who presents for evaluation of elevated liver enzymes and vitamin D levels.    She has expressed concern regarding her kidney function, as she perceived the values to be elevated based on her interpretation of the lab results. She is seeking advice on potential dietary modifications to improve her liver function. She reports no alcohol consumption.    She has abstained from taking her vitamin D supplement this week due to an observed elevation in her vitamin D levels.    Supplemental Information  She is currently under the care of Dr. Casanova and has recently transitioned from leflunomide to Arava. Additionally, she is in the process of switching from meloxicam to an alternative medication.    SOCIAL HISTORY  She does not drink alcohol.         Latest Reference Range & Units 12/05/24 08:01   Sodium 134 - 144 mmol/L 141   Potassium 3.5 - 5.2 mmol/L 3.9   Chloride 96 - 106 mmol/L 107 (H)   Co2 20 - 29 mmol/L 23   Glucose 70 - 99 mg/dL 72   Bun 6 - 24 mg/dL 20   Creatinine 0.57 - 1.00 mg/dL 0.59   Bun-Creatinine Ratio 9 - 23  34 (H)   Calcium 8.7 - 10.2 mg/dL 8.8   AST(SGOT) 0 - 40 IU/L 56 (H)   ALT(SGPT) 0 - 32 IU/L 67 (H)   Alkaline Phosphatase 44 - 121 IU/L 80   Total Bilirubin 0.0 - 1.2 mg/dL 0.9   Albumin 3.8 - 4.9 g/dL 3.4 (L)  "  Total Protein 6.0 - 8.5 g/dL 6.0   Globulin 1.5 - 4.5 g/dL 2.6   (H): Data is abnormally high  (L): Data is abnormally low      Current medicines (including changes today)  Current Outpatient Medications   Medication Sig Dispense Refill    pantoprazole (PROTONIX) 20 MG tablet Take 1 Tablet by mouth every day. 90 Tablet 0    traMADol (ULTRAM) 50 MG Tab Take 1 Tablet by mouth 2 times a day as needed for Mild Pain for up to 60 days. 120 Tablet 0    ergocalciferol (DRISDOL) 32400 UNIT capsule Take 1 Capsule by mouth every 7 days. (Patient taking differently: Take 50,000 Units by mouth every 7 days. EVERY OTHER WEEK) 7 Capsule 0    rosuvastatin (CRESTOR) 10 MG Tab TAKE 1 TABLET BY MOUTH EVERY 24 HOURS 100 Tablet 2    leflunomide (ARAVA) 10 MG Tab Take 1 Tablet by mouth every day.      progesterone (PROMETRIUM) 200 MG capsule TAKE 1 CAPSULE BY MOUTH EVERY NIGHT AS DIRECTED       No current facility-administered medications for this visit.     She  has a past medical history of Fluid retention and Snoring.  She  has a past surgical history that includes pr  delivery only (); liposuction (2009); lip injection (2009); and gastric sleeve laparoscopy (2016).  Social History     Tobacco Use    Smoking status: Never    Smokeless tobacco: Never   Vaping Use    Vaping status: Never Used   Substance Use Topics    Alcohol use: Not Currently    Drug use: No     Social History     Social History Narrative    Not on file     Family History   Problem Relation Age of Onset    Non-contributory Neg Hx      No family status information on file.         ROS    The pertinent  ROS findings can be seen in the HPI above.     All other systems reviewed and are negative     Objective:     BP (!) 140/76 (BP Location: Left arm, Patient Position: Sitting, BP Cuff Size: Adult)   Pulse 82   Temp 36.2 °C (97.2 °F) (Temporal)   Ht 1.632 m (5' 4.25\")   Wt 59 kg (130 lb)   SpO2 95%  Body mass index is 22.14 kg/m².    "   Physical Exam:    Constitutional: Alert, no distress.  Skin: No suspicious lesions  Eye: Equal, round and reactive, conjunctiva clear, lids normal.  ENMT: Lips without lesions, good dentition, oropharynx clear.  Neck: Trachea midline, no masses, no thyromegaly. No cervical or supraclavicular lymphadenopathy.  Respiratory: Unlabored respiratory effort, lungs clear to auscultation, no wheezes, no ronchi.  Cardiovascular: Normal S1, S2, no murmur, no edema  Abdomen: Soft, non-tender, no masses, no hepatosplenomegaly.        Assessment and Plan:   The following treatment plan was discussed    All recent labs and provider notes reviewed    Assessment & Plan  1. Elevated liver enzymes.  Her liver function tests show a slight elevation, which is not alarming. This could be a side effect of her medications, including rosuvastatin and Arava. She is not overweight, so fatty liver is unlikely. She is advised to avoid alcohol.    2. Elevated vitamin D levels.  Her vitamin D levels are elevated. She is advised to take her vitamin D supplement every other week instead of weekly.        1. Gastroesophageal reflux disease without esophagitis  - pantoprazole (PROTONIX) 20 MG tablet; Take 1 Tablet by mouth every day.  Dispense: 90 Tablet; Refill: 0    2. Mixed hyperlipidemia    3. Elevated LFTs             Instructed to Follow up in clinic or ER for worsening symptoms, difficulty breathing, lack of expected recovery, or should new symptoms or problems arise.    Followup: Return in about 6 months (around 6/12/2025) for Reevaluation, labs.

## 2025-01-09 ENCOUNTER — APPOINTMENT (OUTPATIENT)
Dept: MEDICAL GROUP | Age: 60
End: 2025-01-09
Payer: COMMERCIAL

## 2025-01-29 DIAGNOSIS — M05.79 RHEUMATOID ARTHRITIS INVOLVING MULTIPLE SITES WITH POSITIVE RHEUMATOID FACTOR (HCC): ICD-10-CM

## 2025-01-30 NOTE — PROGRESS NOTES
1. Caller Name: [facility]                        Call Back Number: [phone]      How would the patient prefer to be contacted with a response: Phone call OK to leave a detailed message    DR. SNELL'S SCHEDULING AND REFERRAL CALLED REQUESTING A YEARLY REFERRAL FOR PTS CARE. PT HAS AN APPOINTMENT ON 02/05/2025.

## 2025-03-14 ENCOUNTER — TELEPHONE (OUTPATIENT)
Dept: MEDICAL GROUP | Age: 60
End: 2025-03-14
Payer: COMMERCIAL

## 2025-03-14 NOTE — TELEPHONE ENCOUNTER
Phone Number Called: [phone]    Call outcome: Spoke to patient regarding message below.    Message: RHEUMATOLOGY OFFICE CALLED TO REQUEST A BACK DATED REFERRAL FOR PATIENTS CLAIM FOR NOVEMBER 2023. INFORMED THEM THAT WE DO NOT DO BACK DATED REFERRALS.

## 2025-03-17 DIAGNOSIS — K21.9 GASTROESOPHAGEAL REFLUX DISEASE WITHOUT ESOPHAGITIS: ICD-10-CM

## 2025-03-18 RX ORDER — ERGOCALCIFEROL 1.25 MG/1
CAPSULE, LIQUID FILLED ORAL
Qty: 12 CAPSULE | Refills: 0 | Status: SHIPPED | OUTPATIENT
Start: 2025-03-18

## 2025-03-18 RX ORDER — PANTOPRAZOLE SODIUM 20 MG/1
20 TABLET, DELAYED RELEASE ORAL DAILY
Qty: 90 TABLET | Refills: 0 | Status: SHIPPED | OUTPATIENT
Start: 2025-03-18

## 2025-04-24 ENCOUNTER — TELEPHONE (OUTPATIENT)
Dept: MEDICAL GROUP | Age: 60
End: 2025-04-24
Payer: COMMERCIAL

## 2025-04-24 NOTE — TELEPHONE ENCOUNTER
Phone Number Called: [phone]     Call outcome: Left detailed message for patient. Informed to call back with any additional questions.    Message: lvm to inform Rheumatology office that patient needs to go with [facility] for insurance purposes. They recommend it goes through them.

## 2025-05-21 SDOH — ECONOMIC STABILITY: INCOME INSECURITY: IN THE LAST 12 MONTHS, WAS THERE A TIME WHEN YOU WERE NOT ABLE TO PAY THE MORTGAGE OR RENT ON TIME?: NO

## 2025-05-21 SDOH — ECONOMIC STABILITY: FOOD INSECURITY: WITHIN THE PAST 12 MONTHS, YOU WORRIED THAT YOUR FOOD WOULD RUN OUT BEFORE YOU GOT MONEY TO BUY MORE.: NEVER TRUE

## 2025-05-21 SDOH — HEALTH STABILITY: PHYSICAL HEALTH: ON AVERAGE, HOW MANY DAYS PER WEEK DO YOU ENGAGE IN MODERATE TO STRENUOUS EXERCISE (LIKE A BRISK WALK)?: 3 DAYS

## 2025-05-21 SDOH — ECONOMIC STABILITY: FOOD INSECURITY: WITHIN THE PAST 12 MONTHS, THE FOOD YOU BOUGHT JUST DIDN'T LAST AND YOU DIDN'T HAVE MONEY TO GET MORE.: NEVER TRUE

## 2025-05-21 SDOH — ECONOMIC STABILITY: INCOME INSECURITY: HOW HARD IS IT FOR YOU TO PAY FOR THE VERY BASICS LIKE FOOD, HOUSING, MEDICAL CARE, AND HEATING?: SOMEWHAT HARD

## 2025-05-21 SDOH — HEALTH STABILITY: PHYSICAL HEALTH: ON AVERAGE, HOW MANY MINUTES DO YOU ENGAGE IN EXERCISE AT THIS LEVEL?: 30 MIN

## 2025-05-21 SDOH — ECONOMIC STABILITY: TRANSPORTATION INSECURITY
IN THE PAST 12 MONTHS, HAS LACK OF TRANSPORTATION KEPT YOU FROM MEETINGS, WORK, OR FROM GETTING THINGS NEEDED FOR DAILY LIVING?: NO

## 2025-05-21 ASSESSMENT — LIFESTYLE VARIABLES
HOW MANY STANDARD DRINKS CONTAINING ALCOHOL DO YOU HAVE ON A TYPICAL DAY: 1 OR 2
SKIP TO QUESTIONS 9-10: 1
HOW OFTEN DO YOU HAVE A DRINK CONTAINING ALCOHOL: MONTHLY OR LESS
AUDIT-C TOTAL SCORE: 1
HOW OFTEN DO YOU HAVE SIX OR MORE DRINKS ON ONE OCCASION: NEVER

## 2025-05-21 ASSESSMENT — SOCIAL DETERMINANTS OF HEALTH (SDOH)
HOW OFTEN DO YOU HAVE SIX OR MORE DRINKS ON ONE OCCASION: NEVER
HOW HARD IS IT FOR YOU TO PAY FOR THE VERY BASICS LIKE FOOD, HOUSING, MEDICAL CARE, AND HEATING?: SOMEWHAT HARD
WITHIN THE PAST 12 MONTHS, YOU WORRIED THAT YOUR FOOD WOULD RUN OUT BEFORE YOU GOT THE MONEY TO BUY MORE: NEVER TRUE
IN A TYPICAL WEEK, HOW MANY TIMES DO YOU TALK ON THE PHONE WITH FAMILY, FRIENDS, OR NEIGHBORS?: THREE TIMES A WEEK
DO YOU BELONG TO ANY CLUBS OR ORGANIZATIONS SUCH AS CHURCH GROUPS UNIONS, FRATERNAL OR ATHLETIC GROUPS, OR SCHOOL GROUPS?: NO
HOW MANY DRINKS CONTAINING ALCOHOL DO YOU HAVE ON A TYPICAL DAY WHEN YOU ARE DRINKING: 1 OR 2
HOW OFTEN DO YOU GET TOGETHER WITH FRIENDS OR RELATIVES?: ONCE A WEEK
IN A TYPICAL WEEK, HOW MANY TIMES DO YOU TALK ON THE PHONE WITH FAMILY, FRIENDS, OR NEIGHBORS?: THREE TIMES A WEEK
HOW OFTEN DO YOU ATTENT MEETINGS OF THE CLUB OR ORGANIZATION YOU BELONG TO?: NEVER
HOW OFTEN DO YOU ATTENT MEETINGS OF THE CLUB OR ORGANIZATION YOU BELONG TO?: NEVER
HOW OFTEN DO YOU HAVE A DRINK CONTAINING ALCOHOL: MONTHLY OR LESS
DO YOU BELONG TO ANY CLUBS OR ORGANIZATIONS SUCH AS CHURCH GROUPS UNIONS, FRATERNAL OR ATHLETIC GROUPS, OR SCHOOL GROUPS?: NO
HOW OFTEN DO YOU ATTEND CHURCH OR RELIGIOUS SERVICES?: MORE THAN 4 TIMES PER YEAR
IN THE PAST 12 MONTHS, HAS THE ELECTRIC, GAS, OIL, OR WATER COMPANY THREATENED TO SHUT OFF SERVICE IN YOUR HOME?: NO
HOW OFTEN DO YOU ATTEND CHURCH OR RELIGIOUS SERVICES?: MORE THAN 4 TIMES PER YEAR
HOW OFTEN DO YOU GET TOGETHER WITH FRIENDS OR RELATIVES?: ONCE A WEEK

## 2025-05-22 ENCOUNTER — OFFICE VISIT (OUTPATIENT)
Dept: MEDICAL GROUP | Age: 60
End: 2025-05-22
Payer: COMMERCIAL

## 2025-05-22 VITALS
DIASTOLIC BLOOD PRESSURE: 70 MMHG | WEIGHT: 136 LBS | BODY MASS INDEX: 23.22 KG/M2 | HEIGHT: 64 IN | OXYGEN SATURATION: 99 % | TEMPERATURE: 97 F | HEART RATE: 90 BPM | SYSTOLIC BLOOD PRESSURE: 120 MMHG

## 2025-05-22 DIAGNOSIS — E78.2 MIXED HYPERLIPIDEMIA: ICD-10-CM

## 2025-05-22 DIAGNOSIS — K21.9 GASTROESOPHAGEAL REFLUX DISEASE WITHOUT ESOPHAGITIS: ICD-10-CM

## 2025-05-22 DIAGNOSIS — E55.9 VITAMIN D DEFICIENCY: Primary | ICD-10-CM

## 2025-05-22 DIAGNOSIS — D17.1 LIPOMA OF TORSO: ICD-10-CM

## 2025-05-22 DIAGNOSIS — Z00.00 ANNUAL PHYSICAL EXAM: ICD-10-CM

## 2025-05-22 RX ORDER — TRAMADOL HYDROCHLORIDE 50 MG/1
1 TABLET ORAL 2 TIMES DAILY PRN
COMMUNITY

## 2025-05-22 RX ORDER — ERGOCALCIFEROL 1.25 MG/1
50000 CAPSULE, LIQUID FILLED ORAL
Qty: 12 CAPSULE | Refills: 0 | Status: SHIPPED | OUTPATIENT
Start: 2025-05-22

## 2025-05-22 RX ORDER — MELOXICAM 15 MG/1
TABLET ORAL
COMMUNITY

## 2025-05-22 RX ORDER — SPIRONOLACTONE 50 MG/1
TABLET, FILM COATED ORAL
COMMUNITY

## 2025-05-22 RX ORDER — CELECOXIB 200 MG/1
CAPSULE ORAL
COMMUNITY
Start: 2024-12-16

## 2025-05-22 RX ORDER — PANTOPRAZOLE SODIUM 20 MG/1
20 TABLET, DELAYED RELEASE ORAL DAILY
Qty: 90 TABLET | Refills: 2 | Status: SHIPPED | OUTPATIENT
Start: 2025-05-22

## 2025-05-22 RX ORDER — UPADACITINIB 15 MG/1
TABLET, EXTENDED RELEASE ORAL
COMMUNITY
Start: 2024-12-16

## 2025-05-22 RX ORDER — ADALIMUMAB 40MG/0.4ML
KIT SUBCUTANEOUS
COMMUNITY
End: 2025-05-22

## 2025-05-22 RX ORDER — ROSUVASTATIN CALCIUM 10 MG/1
TABLET, COATED ORAL
Qty: 100 TABLET | Refills: 2 | Status: SHIPPED | OUTPATIENT
Start: 2025-05-22

## 2025-05-22 ASSESSMENT — FIBROSIS 4 INDEX: FIB4 SCORE: 2.5

## 2025-05-22 ASSESSMENT — PATIENT HEALTH QUESTIONNAIRE - PHQ9: CLINICAL INTERPRETATION OF PHQ2 SCORE: 0

## 2025-05-22 NOTE — PROGRESS NOTES
Subjective:     CC:   Chief Complaint   Patient presents with    Annual Exam     aPE        HPI:   Rowena Tejeda is a 59 y.o. female who who presents for the following    1. Annual physical exam      2. Lipoma of torso    History of Present Illness  The patient, a 59-year-old female, presents for her annual wellness examination.    Active Lifestyle  She maintains an active lifestyle, engaging in nightly ambulation with her dogs for approximately one hour and attending the gym three times weekly. Her gym regimen includes resistance training exercises such as leg presses and upper extremity workouts, in addition to treadmill use.    Recurrence of Lipoma  The patient reports the recurrence of a lipoma that was resected from her back two years ago. Despite the previous deep resection, the lipoma has recurred, causing discomfort. She is seeking a referral for another surgical excision.  - Onset: Recurrence after two years.  - Location: Back.  - Character: Discomfort due to the lipoma.  - Severity: Requires referral for another surgical excision.          Patient has GYN provider: Yes  Last Pap Smear: overdue   H/O Abnormal Pap: No  Last Mammogram: UTD  Last Bone Density Test: at age 67  Last Colorectal Cancer Screening: UTD  Last Tdap: UTD  Received HPV series: No    Exercise: moderate regular exercise, aerobic < 3 days a week  Diet: regular      Patient's last menstrual period was 05/07/2016 (approximate).  She has not utilized hormone replacement therapy.  Denies any menopausal symptoms.  No significant bloating/fluid retention, pelvic pain, or dyspareunia. No abnormal vaginal discharge.   No breast tenderness, mass, nipple discharge or changes in size or contour.    OB History   No obstetric history on file.      She  reports being sexually active and has had partner(s) who are male. She reports using the following method of birth control/protection: None.    She  has a past medical history of Fluid retention and  "Snoring.  She  has a past surgical history that includes pr  delivery only (); liposuction (2009); lip injection (2009); and gastric sleeve laparoscopy (2016).    Family History   Problem Relation Age of Onset    Non-contributory Neg Hx      Social History[1]    Patient Active Problem List    Diagnosis Date Noted    Elevated LFTs 2024    Gastroesophageal reflux disease without esophagitis 2024    Positive colorectal cancer screening using Cologuard test 2023    Elevated BP without diagnosis of hypertension 2023    Need for malaria prophylaxis 2022    Mixed hyperlipidemia 2022    Snoring     Rheumatoid arthritis involving multiple sites (HCC) 07/10/2020    Neoplasm of uncertain behavior 2020    Myalgia, unspecified site 2020    Obesity (BMI 30-39.9) 2020    RUQ pain 2020    Chronic pain 01/15/2020     Current Medications[2]  Allergies[3]    Review of Systems   Constitutional: Negative for fever, chills and malaise/fatigue.   HENT: Negative for congestion.    Eyes: Negative for pain.   Respiratory: Negative for cough and shortness of breath.    Cardiovascular: Negative for chest pain and leg swelling.   Gastrointestinal: Negative for nausea, vomiting, abdominal pain and diarrhea.   Genitourinary: Negative for dysuria and hematuria.   Skin: Negative for rash.   Neurological: Negative for dizziness, focal weakness and headaches.   Endo/Heme/Allergies: Does not bruise/bleed easily.   Psychiatric/Behavioral: Negative for depression.  The patient is not nervous/anxious.      Objective:   /70 (BP Location: Left arm, Patient Position: Sitting, BP Cuff Size: Adult)   Pulse 90   Temp 36.1 °C (97 °F) (Temporal)   Ht 1.626 m (5' 4\")   Wt 61.7 kg (136 lb)   LMP 2016 (Approximate)   SpO2 99%   BMI 23.34 kg/m²     Wt Readings from Last 4 Encounters:   25 61.7 kg (136 lb)   24 59 kg (130 lb)   24 58.1 kg " (128 lb)   11/10/23 69.4 kg (153 lb)         Physical Exam:  Constitutional: Alert, no distress, well-groomed.  Skin: No rashes in visible areas.  Eye: Round. Conjunctiva clear, lids normal. No icterus.   ENMT: Lips pink without lesions, good dentition, moist mucous membranes. Phonation normal.  Neck: No masses, no thyromegaly. Moves freely without pain.  Respiratory: Unlabored respiratory effort, no cough or audible wheeze  Psych: Alert and oriented x3, normal affect and mood.     Assessment and Plan:     1. Annual physical exam    2. Lipoma of torso  - Referral to General Surgery    3. Gastroesophageal reflux disease without esophagitis  - pantoprazole (PROTONIX) 20 MG tablet; Take 1 Tablet by mouth every day.  Dispense: 90 Tablet; Refill: 2    4. Mixed hyperlipidemia  - rosuvastatin (CRESTOR) 10 MG Tab; TAKE 1 TABLET BY MOUTH EVERY 24 HOURS  Dispense: 100 Tablet; Refill: 2  - vitamin D2, Ergocalciferol, (DRISDOL) 1.25 MG (22006 UT) Cap capsule; Take 1 Capsule by mouth every 7 days.  Dispense: 12 Capsule; Refill: 0  - CBC WITH DIFFERENTIAL; Future  - Comp Metabolic Panel; Future  - Lipid Profile; Future  - TSH+FREE T4  - T3 FREE; Future  - VITAMIN D,25 HYDROXY (DEFICIENCY); Future    5. Vitamin D deficiency  - VITAMIN D,25 HYDROXY (DEFICIENCY); Future    Other orders  - Adalimumab (HUMIRA, 2 PEN,) 40 MG/0.4ML Auto-injector Kit  - Adalimumab (HUMIRA, 2 PEN,) 40 MG/0.4ML Auto-injector Kit  - celecoxib (CELEBREX) 200 MG Cap; 1 capsule with food Orally twice a day as needed for 30 days  - meloxicam (MOBIC) 15 MG tablet; TAKE 1 TABLET BY MOUTH EVERY DAY OR AS NEEDED  - spironolactone (ALDACTONE) 50 MG Tab; TAKE 1 TABLET BY MOUTH DAILY Oral for 30 Days  - traMADol (ULTRAM) 50 MG Tab; Take 1 Tablet by mouth 2 times a day as needed.  - RINVOQ 15 MG TABLET SR 24 HR; 1 tablet Orally Once a day for 30 days      We reviewed anticipatory guidelines  The patient is up-to-date on all vaccines  The patient is  due for annual  labs  We discussed diet and exercise  Specifically we discussed needing 150 minutes of cardiovascular exercise weekly  Also to incorporate and aerobic exercises  We discussed sunscreen  We discussed seatbelt safety       Health maintenance:     Labs per orders  Immunizations per orders  Patient counseled about skin care, diet, supplements, and exercise.  Discussed  diet and exercise, pap smear     Follow-up: Return in about 1 year (around 5/22/2026) for Reevaluation, labs.        [1]   Social History  Tobacco Use    Smoking status: Never    Smokeless tobacco: Never   Vaping Use    Vaping status: Never Used   Substance Use Topics    Alcohol use: Not Currently    Drug use: No   [2]   Current Outpatient Medications   Medication Sig Dispense Refill    celecoxib (CELEBREX) 200 MG Cap 1 capsule with food Orally twice a day as needed for 30 days      meloxicam (MOBIC) 15 MG tablet TAKE 1 TABLET BY MOUTH EVERY DAY OR AS NEEDED      spironolactone (ALDACTONE) 50 MG Tab TAKE 1 TABLET BY MOUTH DAILY Oral for 30 Days      traMADol (ULTRAM) 50 MG Tab Take 1 Tablet by mouth 2 times a day as needed.      RINVOQ 15 MG TABLET SR 24 HR 1 tablet Orally Once a day for 30 days      pantoprazole (PROTONIX) 20 MG tablet Take 1 Tablet by mouth every day. 90 Tablet 2    rosuvastatin (CRESTOR) 10 MG Tab TAKE 1 TABLET BY MOUTH EVERY 24 HOURS 100 Tablet 2    vitamin D2, Ergocalciferol, (DRISDOL) 1.25 MG (42544 UT) Cap capsule Take 1 Capsule by mouth every 7 days. 12 Capsule 0    leflunomide (ARAVA) 10 MG Tab Take 1 Tablet by mouth every day.      progesterone (PROMETRIUM) 200 MG capsule TAKE 1 CAPSULE BY MOUTH EVERY NIGHT AS DIRECTED      Adalimumab (HUMIRA, 2 PEN,) 40 MG/0.4ML Auto-injector Kit       Adalimumab (HUMIRA, 2 PEN,) 40 MG/0.4ML Auto-injector Kit        No current facility-administered medications for this visit.   [3] No Known Allergies

## 2025-05-28 LAB
25(OH)D3+25(OH)D2 SERPL-MCNC: 84.1 NG/ML (ref 30–100)
ALBUMIN SERPL-MCNC: 4.4 G/DL (ref 3.8–4.9)
ALP SERPL-CCNC: 65 IU/L (ref 44–121)
ALT SERPL-CCNC: 13 IU/L (ref 0–32)
AST SERPL-CCNC: 18 IU/L (ref 0–40)
BASOPHILS # BLD AUTO: 0 X10E3/UL (ref 0–0.2)
BASOPHILS NFR BLD AUTO: 1 %
BILIRUB SERPL-MCNC: 1 MG/DL (ref 0–1.2)
BUN SERPL-MCNC: 17 MG/DL (ref 6–24)
BUN/CREAT SERPL: 23 (ref 9–23)
CALCIUM SERPL-MCNC: 9.5 MG/DL (ref 8.7–10.2)
CHLORIDE SERPL-SCNC: 108 MMOL/L (ref 96–106)
CHOLEST SERPL-MCNC: 148 MG/DL (ref 100–199)
CO2 SERPL-SCNC: 20 MMOL/L (ref 20–29)
CREAT SERPL-MCNC: 0.74 MG/DL (ref 0.57–1)
EGFRCR SERPLBLD CKD-EPI 2021: 94 ML/MIN/1.73
EOSINOPHIL # BLD AUTO: 0 X10E3/UL (ref 0–0.4)
EOSINOPHIL NFR BLD AUTO: 1 %
ERYTHROCYTE [DISTWIDTH] IN BLOOD BY AUTOMATED COUNT: 13.6 % (ref 11.7–15.4)
GLOBULIN SER CALC-MCNC: 2.4 G/DL (ref 1.5–4.5)
GLUCOSE SERPL-MCNC: 79 MG/DL (ref 70–99)
HCT VFR BLD AUTO: 38.8 % (ref 34–46.6)
HDLC SERPL-MCNC: 80 MG/DL
HGB BLD-MCNC: 12.6 G/DL (ref 11.1–15.9)
IMM GRANULOCYTES # BLD AUTO: 0 X10E3/UL (ref 0–0.1)
IMM GRANULOCYTES NFR BLD AUTO: 0 %
IMMATURE CELLS  115398: NORMAL
LDL CALC COMMENT:: NORMAL
LDLC SERPL CALC-MCNC: 57 MG/DL (ref 0–99)
LYMPHOCYTES # BLD AUTO: 1.1 X10E3/UL (ref 0.7–3.1)
LYMPHOCYTES NFR BLD AUTO: 28 %
MCH RBC QN AUTO: 31 PG (ref 26.6–33)
MCHC RBC AUTO-ENTMCNC: 32.5 G/DL (ref 31.5–35.7)
MCV RBC AUTO: 95 FL (ref 79–97)
MONOCYTES # BLD AUTO: 0.2 X10E3/UL (ref 0.1–0.9)
MONOCYTES NFR BLD AUTO: 5 %
MORPHOLOGY BLD-IMP: NORMAL
NEUTROPHILS # BLD AUTO: 2.5 X10E3/UL (ref 1.4–7)
NEUTROPHILS NFR BLD AUTO: 65 %
NRBC BLD AUTO-RTO: NORMAL %
PLATELET # BLD AUTO: 270 X10E3/UL (ref 150–450)
POTASSIUM SERPL-SCNC: 4.5 MMOL/L (ref 3.5–5.2)
PROT SERPL-MCNC: 6.8 G/DL (ref 6–8.5)
RBC # BLD AUTO: 4.07 X10E6/UL (ref 3.77–5.28)
SODIUM SERPL-SCNC: 140 MMOL/L (ref 134–144)
T3FREE SERPL-MCNC: 2.4 PG/ML (ref 2–4.4)
T4 FREE SERPL-MCNC: 1.01 NG/DL (ref 0.82–1.77)
TRIGL SERPL-MCNC: 52 MG/DL (ref 0–149)
TSH SERPL DL<=0.005 MIU/L-ACNC: 1.84 UIU/ML (ref 0.45–4.5)
VLDLC SERPL CALC-MCNC: 11 MG/DL (ref 5–40)
WBC # BLD AUTO: 3.8 X10E3/UL (ref 3.4–10.8)

## 2025-05-30 ENCOUNTER — RESULTS FOLLOW-UP (OUTPATIENT)
Dept: MEDICAL GROUP | Age: 60
End: 2025-05-30

## 2025-06-05 NOTE — Clinical Note
REFERRAL APPROVAL NOTICE         Sent on June 5, 2025                   Rowena Tejeda  [address]  [place] [address]                   Dear Ms. Tejeda,    After a careful review of the medical information and benefit coverage, [facility] has processed your referral. See below for additional details.    If applicable, you must be actively enrolled with your insurance for coverage of the authorized service. If you have any questions regarding your coverage, please contact your insurance directly.    REFERRAL INFORMATION   Referral #:  [id number]  Referred-To Department    Referred-By Provider:  General Surgery    Chalino Mccormick M.D.   Department Of Surgery      [address] [place] [address]  [phone] [address], [address]  [place] [id number] [phone]    Referral Start Date:  06/02/2025  Referral End Date:   06/01/2026             SCHEDULING  If you do not already have an appointment, please call [phone] to make an appointment.     MORE INFORMATION  If you do not already have a [employer] account, sign up at: [email or web address]  You can access your medical information, make appointments, see lab results, billing information, and more.  If you have questions regarding this referral, please contact  the [facility] Referrals department at:             [phone]. Monday - Friday 8:00AM - 5:00PM.     Sincerely,    [facility]

## 2025-06-23 DIAGNOSIS — E78.2 MIXED HYPERLIPIDEMIA: ICD-10-CM

## 2025-06-24 RX ORDER — ROSUVASTATIN CALCIUM 10 MG/1
10 TABLET, COATED ORAL EVERY 24 HOURS
Qty: 100 TABLET | Refills: 2 | Status: SHIPPED
Start: 2025-06-24

## 2025-08-25 ENCOUNTER — OFFICE VISIT (OUTPATIENT)
Facility: MEDICAL CENTER | Age: 60
End: 2025-08-25
Payer: COMMERCIAL

## 2025-08-25 VITALS
HEART RATE: 95 BPM | WEIGHT: 143.3 LBS | BODY MASS INDEX: 24.46 KG/M2 | OXYGEN SATURATION: 96 % | HEIGHT: 64 IN | SYSTOLIC BLOOD PRESSURE: 142 MMHG | TEMPERATURE: 97.7 F | DIASTOLIC BLOOD PRESSURE: 90 MMHG

## 2025-08-25 DIAGNOSIS — M79.89 SOFT TISSUE MASS: Primary | ICD-10-CM

## 2025-08-25 PROCEDURE — 99203 OFFICE O/P NEW LOW 30 MIN: CPT | Performed by: ORTHOPAEDIC SURGERY

## 2025-08-25 PROCEDURE — 3077F SYST BP >= 140 MM HG: CPT | Performed by: ORTHOPAEDIC SURGERY

## 2025-08-25 PROCEDURE — 3080F DIAST BP >= 90 MM HG: CPT | Performed by: ORTHOPAEDIC SURGERY

## 2025-08-25 ASSESSMENT — ENCOUNTER SYMPTOMS
MYALGIAS: 0
WEAKNESS: 0
SENSORY CHANGE: 0
SHORTNESS OF BREATH: 0

## 2025-08-25 ASSESSMENT — FIBROSIS 4 INDEX: FIB4 SCORE: 1.053930372827935332
